# Patient Record
Sex: FEMALE | Race: WHITE | NOT HISPANIC OR LATINO | Employment: FULL TIME | URBAN - METROPOLITAN AREA
[De-identification: names, ages, dates, MRNs, and addresses within clinical notes are randomized per-mention and may not be internally consistent; named-entity substitution may affect disease eponyms.]

---

## 2017-08-04 ENCOUNTER — OFFICE VISIT (OUTPATIENT)
Dept: URGENT CARE | Facility: CLINIC | Age: 37
End: 2017-08-04
Payer: COMMERCIAL

## 2017-08-04 VITALS
OXYGEN SATURATION: 100 % | SYSTOLIC BLOOD PRESSURE: 122 MMHG | HEART RATE: 77 BPM | TEMPERATURE: 98 F | RESPIRATION RATE: 18 BRPM | WEIGHT: 138 LBS | DIASTOLIC BLOOD PRESSURE: 78 MMHG | BODY MASS INDEX: 20.92 KG/M2 | HEIGHT: 68 IN

## 2017-08-04 DIAGNOSIS — K52.9 GASTROENTERITIS: ICD-10-CM

## 2017-08-04 DIAGNOSIS — B35.3 TINEA PEDIS OF RIGHT FOOT: Primary | ICD-10-CM

## 2017-08-04 PROCEDURE — 3008F BODY MASS INDEX DOCD: CPT | Mod: S$GLB,,, | Performed by: EMERGENCY MEDICINE

## 2017-08-04 PROCEDURE — 99203 OFFICE O/P NEW LOW 30 MIN: CPT | Mod: S$GLB,,, | Performed by: EMERGENCY MEDICINE

## 2017-08-04 RX ORDER — MUPIROCIN 20 MG/G
OINTMENT TOPICAL
Qty: 1 TUBE | Refills: 0 | Status: SHIPPED | OUTPATIENT
Start: 2017-08-04 | End: 2018-04-18

## 2017-08-04 RX ORDER — ONDANSETRON 4 MG/1
TABLET, ORALLY DISINTEGRATING ORAL
Qty: 10 TABLET | Refills: 0 | Status: SHIPPED | OUTPATIENT
Start: 2017-08-04 | End: 2018-04-18

## 2017-08-04 RX ORDER — CEPHALEXIN 500 MG/1
500 CAPSULE ORAL EVERY 8 HOURS
Qty: 30 CAPSULE | Refills: 0 | Status: SHIPPED | OUTPATIENT
Start: 2017-08-04 | End: 2018-04-18

## 2017-08-04 NOTE — PROGRESS NOTES
"Subjective:       Patient ID: Natalia Miller is a 36 y.o. female.    Vitals:  height is 5' 8" (1.727 m) and weight is 62.6 kg (138 lb). Her temperature is 97.7 °F (36.5 °C). Her blood pressure is 122/78 and her pulse is 77. Her respiration is 18 and oxygen saturation is 100%.     Chief Complaint: Nausea and Toe Pain    Pt actually came because of the GI issues. She had nausea with diarrhea several days ago. The diarrhea went away after 24 hours. She was better and did eat supper last night She vomited again today. She got some OTC anti-emetic and feels better now. She is not pregnant and is on her period. No abdominal pain  She also has a swollen right little toe and a sore she sees. The toe discomfort has been for about one month.  She was concerned about staph infection.      Nausea   This is a new problem. Episode onset: 4 days. The problem occurs intermittently. The problem has been unchanged. Associated symptoms include fatigue, headaches, joint swelling and nausea. Nothing aggravates the symptoms. She has tried nothing for the symptoms. The treatment provided no relief.   Toe Pain    Incident onset: 1 month. The incident occurred at home. There was no injury mechanism. The pain is present in the right foot. The pain is at a severity of 1/10. The pain is mild. The pain has been constant since onset. She reports no foreign bodies present. Nothing aggravates the symptoms. The treatment provided no relief.     Review of Systems   Constitution: Positive for fatigue.   HENT: Positive for headaches.    Cardiovascular: Negative.    Endocrine: Negative.    Hematologic/Lymphatic: Negative.    Skin: Positive for color change.   Musculoskeletal: Positive for joint swelling.   Gastrointestinal: Positive for diarrhea and nausea.   Genitourinary: Negative.    Psychiatric/Behavioral: Negative.        Objective:      Physical Exam   Constitutional: She is oriented to person, place, and time. She appears well-developed " and well-nourished. She is cooperative.  Non-toxic appearance. She does not appear ill. No distress.   HENT:   Head: Normocephalic and atraumatic.   Right Ear: Hearing, tympanic membrane, external ear and ear canal normal.   Left Ear: Hearing, tympanic membrane, external ear and ear canal normal.   Nose: Nose normal. No mucosal edema, rhinorrhea or nasal deformity. No epistaxis. Right sinus exhibits no maxillary sinus tenderness and no frontal sinus tenderness. Left sinus exhibits no maxillary sinus tenderness and no frontal sinus tenderness.   Mouth/Throat: Uvula is midline, oropharynx is clear and moist and mucous membranes are normal. No trismus in the jaw. Normal dentition. No uvula swelling. No posterior oropharyngeal erythema.   Eyes: Conjunctivae and lids are normal. Right eye exhibits no discharge. Left eye exhibits no discharge. No scleral icterus.   Sclera clear bilat   Neck: Trachea normal, normal range of motion, full passive range of motion without pain and phonation normal. Neck supple.   Cardiovascular: Normal rate, regular rhythm, normal heart sounds, intact distal pulses and normal pulses.    Pulmonary/Chest: Effort normal and breath sounds normal. No respiratory distress.   Abdominal: Soft. Normal appearance and bowel sounds are normal. She exhibits no distension, no pulsatile midline mass and no mass. There is no tenderness.   Musculoskeletal: Normal range of motion. She exhibits no edema or deformity.   Neurological: She is alert and oriented to person, place, and time. She exhibits normal muscle tone. Coordination normal.   Skin: She is not diaphoretic. There is erythema (mild erythema and swelling to right little toe with  ulceration noted medial aspect of the toe. Dry scaly moist skin in web space of 4-5 th toe bilaterally but Rt>>Lt). No pallor.   Psychiatric: She has a normal mood and affect. Her speech is normal and behavior is normal. Judgment and thought content normal. Cognition and  memory are normal.   Nursing note and vitals reviewed.      Assessment:       1. Tinea pedis of right foot    2. Gastroenteritis        Plan:         Tinea pedis of right foot  -     mupirocin (BACTROBAN) 2 % ointment; Apply topically to wound twice a day  Dispense: 1 Tube; Refill: 0  -     cephALEXin (KEFLEX) 500 MG capsule; Take 1 capsule (500 mg total) by mouth every 8 (eight) hours.  Dispense: 30 capsule; Refill: 0    Gastroenteritis  -     ondansetron (ZOFRAN-ODT) 4 MG TbDL; One tablet sublingual tid prn nausea  Dispense: 10 tablet; Refill: 0

## 2018-04-18 ENCOUNTER — OFFICE VISIT (OUTPATIENT)
Dept: OBSTETRICS AND GYNECOLOGY | Facility: CLINIC | Age: 38
End: 2018-04-18
Attending: OBSTETRICS & GYNECOLOGY
Payer: COMMERCIAL

## 2018-04-18 VITALS
SYSTOLIC BLOOD PRESSURE: 138 MMHG | BODY MASS INDEX: 21.1 KG/M2 | WEIGHT: 139.25 LBS | DIASTOLIC BLOOD PRESSURE: 80 MMHG | HEIGHT: 68 IN

## 2018-04-18 DIAGNOSIS — Z12.31 VISIT FOR SCREENING MAMMOGRAM: ICD-10-CM

## 2018-04-18 DIAGNOSIS — Z01.419 WELL FEMALE EXAM WITH ROUTINE GYNECOLOGICAL EXAM: Primary | ICD-10-CM

## 2018-04-18 DIAGNOSIS — Z11.51 SCREENING FOR HUMAN PAPILLOMAVIRUS: ICD-10-CM

## 2018-04-18 DIAGNOSIS — N90.89 VULVAL LESION: ICD-10-CM

## 2018-04-18 PROCEDURE — 88305 TISSUE EXAM BY PATHOLOGIST: CPT

## 2018-04-18 PROCEDURE — 99395 PREV VISIT EST AGE 18-39: CPT | Mod: S$GLB,,, | Performed by: OBSTETRICS & GYNECOLOGY

## 2018-04-18 PROCEDURE — 87624 HPV HI-RISK TYP POOLED RSLT: CPT

## 2018-04-18 PROCEDURE — 99999 PR PBB SHADOW E&M-EST. PATIENT-LVL III: CPT | Mod: PBBFAC,,, | Performed by: OBSTETRICS & GYNECOLOGY

## 2018-04-18 PROCEDURE — 88305 TISSUE EXAM BY PATHOLOGIST: CPT | Mod: 26,,,

## 2018-04-18 PROCEDURE — 88175 CYTOPATH C/V AUTO FLUID REDO: CPT

## 2018-04-18 RX ORDER — LEVOMILNACIPRAN HYDROCHLORIDE 40 MG/1
CAPSULE, EXTENDED RELEASE ORAL
COMMUNITY
Start: 2018-03-28 | End: 2018-06-29 | Stop reason: DRUGHIGH

## 2018-04-20 NOTE — PROGRESS NOTES
Subjective:       Patient ID: Natalia Miller is a 37 y.o. female.    Chief Complaint:  New patient (Last pap 2016-negative )      History of Present Illness  37 year old her for annual.  Doing well except anxiety that she is currently working with a counselor on.  Reports having a vulvar lesion she would like removed.  Currently taking fetzime.  Periods regular.  No breast concerns.  No pains with intercourse.  Has a history of abnormal pap treated with a leep     GYN & OB History  Patient's last menstrual period was 04/10/2018.   Date of Last Pap: 2018    OB History    Para Term  AB Living   0             SAB TAB Ectopic Multiple Live Births                         Past Medical History:   Diagnosis Date    Abnormal Pap smear     Abnormal Pap smear of cervix     Depression     Deviated septum     Fibroids     Herpes simplex without mention of complication     oral as per pt    HPV (human papilloma virus) anogenital infection     Insomnia     Mental disorder        Past Surgical History:   Procedure Laterality Date    CERVICAL BIOPSY  W/ LOOP ELECTRODE EXCISION      COLPOSCOPY      TONSILLECTOMY, ADENOIDECTOMY         Review of Systems  Review of Systems   Constitutional: Negative for fatigue.   Respiratory: Negative for shortness of breath.    Cardiovascular: Negative for chest pain.   Gastrointestinal: Negative for abdominal pain, constipation, diarrhea and nausea.   Genitourinary: Negative for dyspareunia, dysuria, genital sores, menstrual problem, pelvic pain and vaginal bleeding.   Musculoskeletal: Negative for back pain.   Skin: Negative for rash.   Neurological: Negative for headaches.   Hematological: Negative for adenopathy.   Psychiatric/Behavioral: Positive for dysphoric mood. The patient is nervous/anxious.         Objective:   Physical Exam:   Constitutional: She is oriented to person, place, and time. She appears well-developed and well-nourished.      Neck: No  thyromegaly present.    Cardiovascular: Normal rate.     Pulmonary/Chest: Effort normal and breath sounds normal. Right breast exhibits no mass, no nipple discharge, no skin change, no tenderness and no bleeding. Left breast exhibits no mass, no nipple discharge, no skin change, no tenderness and no bleeding.        Abdominal: Soft. Normal appearance and bowel sounds are normal. She exhibits no distension and no mass. There is no tenderness. There is no rebound and no guarding.     Genitourinary: Vagina normal and uterus normal.       Pelvic exam was performed with patient supine. There is no rash, tenderness, lesion or injury on the right labia. There is no rash, tenderness, lesion or injury on the left labia. Uterus is not enlarged, not fixed and not tender. Cervix is normal. Right adnexum displays no mass, no tenderness and no fullness. Left adnexum displays no mass, no tenderness and no fullness. No erythema, tenderness, rectocele, cystocele or unspecified prolapse of vaginal walls in the vagina. No signs of injury around the vagina. No vaginal discharge found. Cervix exhibits no motion tenderness, no discharge and no friability.           Musculoskeletal: Normal range of motion and moves all extremeties.      Lymphadenopathy:     She has no axillary adenopathy.        Right: No supraclavicular adenopathy present.        Left: No supraclavicular adenopathy present.    Neurological: She is alert and oriented to person, place, and time.    Skin: Skin is warm and dry.    Psychiatric: She has a normal mood and affect. Her behavior is normal. Judgment normal.        Assessment/ Plan:     Well female exam with routine gynecological exam  -     Liquid-based pap smear, screening    Visit for screening mammogram  -     Mammo Digital Screening Bilat with Tomosynthesis CAD; Future; Expected date: 04/18/2018    Screening for human papillomavirus  -     HPV High Risk Genotypes, PCR    Vulval lesion  -     Tissue Specimen To  Pathology, Obstetrics/Gynecology    two vular lesions sent to pathology      Follow-up in about 1 year (around 4/18/2019).    Patient was counseled today on A.C.S. Pap guidelines and recommendations for yearly pelvic exams, mammograms and monthly self breast exams; to see her PCP for other health maintenance.

## 2018-04-24 LAB
HPV16 AG SPEC QL: NEGATIVE
HPV16+18+H RISK 12 DNA CVX-IMP: POSITIVE
HPV18 DNA SPEC QL NAA+PROBE: NEGATIVE

## 2018-05-25 ENCOUNTER — TELEPHONE (OUTPATIENT)
Dept: OBSTETRICS AND GYNECOLOGY | Facility: CLINIC | Age: 38
End: 2018-05-25

## 2018-05-25 NOTE — TELEPHONE ENCOUNTER
Spoke to pt letting her know that Dr. Castillo said she could take the 3:45 slot that day. Pt voiced understanding.

## 2018-05-25 NOTE — TELEPHONE ENCOUNTER
Pt is scheduled for colpo on 06/26 at 3:15 and would like to know if she can come any later that day or any day in [place].

## 2018-06-18 ENCOUNTER — CLINICAL SUPPORT (OUTPATIENT)
Dept: INFECTIOUS DISEASES | Facility: CLINIC | Age: 38
End: 2018-06-18
Payer: COMMERCIAL

## 2018-06-18 ENCOUNTER — OFFICE VISIT (OUTPATIENT)
Dept: INFECTIOUS DISEASES | Facility: CLINIC | Age: 38
End: 2018-06-18

## 2018-06-18 VITALS
DIASTOLIC BLOOD PRESSURE: 84 MMHG | SYSTOLIC BLOOD PRESSURE: 130 MMHG | TEMPERATURE: 98 F | BODY MASS INDEX: 20.61 KG/M2 | HEART RATE: 66 BPM | WEIGHT: 136 LBS | HEIGHT: 68 IN

## 2018-06-18 DIAGNOSIS — Z23 IMMUNIZATION DUE: ICD-10-CM

## 2018-06-18 DIAGNOSIS — Z71.84 TRAVEL ADVICE ENCOUNTER: Primary | ICD-10-CM

## 2018-06-18 DIAGNOSIS — Z71.84 TRAVEL ADVICE ENCOUNTER: ICD-10-CM

## 2018-06-18 PROCEDURE — 90691 TYPHOID VACCINE IM: CPT | Mod: S$GLB,,, | Performed by: INTERNAL MEDICINE

## 2018-06-18 PROCEDURE — 90471 IMMUNIZATION ADMIN: CPT | Mod: S$GLB,,, | Performed by: INTERNAL MEDICINE

## 2018-06-18 PROCEDURE — 99401 PREV MED CNSL INDIV APPRX 15: CPT | Mod: S$GLB,,, | Performed by: INTERNAL MEDICINE

## 2018-06-18 PROCEDURE — 99999 PR PBB SHADOW E&M-EST. PATIENT-LVL III: CPT | Mod: PBBFAC,,, | Performed by: INTERNAL MEDICINE

## 2018-06-18 PROCEDURE — 90636 HEP A/HEP B VACC ADULT IM: CPT | Mod: S$GLB,,, | Performed by: INTERNAL MEDICINE

## 2018-06-18 PROCEDURE — 90472 IMMUNIZATION ADMIN EACH ADD: CPT | Mod: S$GLB,,, | Performed by: INTERNAL MEDICINE

## 2018-06-18 RX ORDER — ATOVAQUONE AND PROGUANIL HYDROCHLORIDE 250; 100 MG/1; MG/1
TABLET, FILM COATED ORAL
Qty: 30 TABLET | Refills: 0 | Status: SHIPPED | OUTPATIENT
Start: 2018-06-18 | End: 2019-04-04

## 2018-06-18 RX ORDER — AZITHROMYCIN 500 MG/1
TABLET, FILM COATED ORAL
Qty: 2 TABLET | Refills: 0 | Status: SHIPPED | OUTPATIENT
Start: 2018-06-18 | End: 2018-06-29

## 2018-06-18 RX ORDER — LEVOMILNACIPRAN HYDROCHLORIDE 80 MG/1
1 CAPSULE, EXTENDED RELEASE ORAL DAILY
Refills: 5 | COMMUNITY
Start: 2018-06-12 | End: 2020-02-28

## 2018-06-18 NOTE — PROGRESS NOTES
Subjective:      Chief Complaint:   Chief Complaint   Patient presents with    Travel Consult     History of Present Illness    Patient  37 y.o. female who presents today for routine pretravel consultation.  The patient reports a past medical history of melanoma, ADHD,.  The patient reports the following medication allergies; sulfa (rash).  The patient reports the following food allergies; none .  The patient will be traveling to [facility] on august 6.  She will fly into [place] and spend 1 night.  Then travel to [facility].  The patient will be at this destination for 21 days. They will spend 1 night in [place].  She will be going hunting the other time.  The patient will be lodging at hotels and at a farm house.  The patient has travelled to the following other countries in the past; [place], [place], [place].  The patient reports that they received all their childhood vaccinations.  The patient reports receipt of the following travel related vaccinations; Tdap in 2014.  The purpose of this trip is vacation.    Review of Systems   Constitutional: Positive for malaise/fatigue.   Gastrointestinal: Positive for diarrhea, nausea and vomiting.   Psychiatric/Behavioral: The patient is nervous/anxious and has insomnia.        Objective:   Physical Exam   Assessment:     Pre-Travel clinic assessment    Plan:   Patient specific risks:      Patient doesn't have any medical problems that would restrict her travel.    Destination specific risks:      -Infectious Disease risks:       Mosquito Borne pathogens:  Reviewed basic mosquito avoidance precautions including wearing long sleeve clothing and insect repellant.  Malarone for malaria prevention.  No risk of yellow fever risk on this itinerary.       Food Borne pathogens:  Reviewed basic hand, food and water sanitation precautions.  Patient instructed to take hand  on their trip.  Typhoid IM vaccine today.  Hepatitis A/B IM series initiated today.   Azithromycin as needed for severe diarrhea.     Blood Borne Pathogens:  Hepatitis a/b vaccine series.     Routine:  She is up to date on Tdap.      -Environmental risks:     Risk of rabies with animal bite exposure was discussed with the patient.

## 2018-06-18 NOTE — PROGRESS NOTES
Pt received her Hepatitis A/B and Typhoid vaccinations. Pt tolerated the injections well. Return appts provided. Pt left the unit in NAD.

## 2018-06-21 ENCOUNTER — TELEPHONE (OUTPATIENT)
Dept: OBSTETRICS AND GYNECOLOGY | Facility: CLINIC | Age: 38
End: 2018-06-21

## 2018-06-26 ENCOUNTER — PROCEDURE VISIT (OUTPATIENT)
Dept: OBSTETRICS AND GYNECOLOGY | Facility: CLINIC | Age: 38
End: 2018-06-26
Payer: COMMERCIAL

## 2018-06-26 VITALS — HEIGHT: 68 IN | BODY MASS INDEX: 20.38 KG/M2 | WEIGHT: 134.5 LBS

## 2018-06-26 DIAGNOSIS — R87.810 CERVICAL HIGH RISK HUMAN PAPILLOMAVIRUS (HPV) DNA TEST POSITIVE: ICD-10-CM

## 2018-06-26 DIAGNOSIS — Z32.02 PREGNANCY TEST NEGATIVE: Primary | ICD-10-CM

## 2018-06-26 DIAGNOSIS — R87.619 ABNORMAL CERVICAL PAPANICOLAOU SMEAR, UNSPECIFIED ABNORMAL PAP FINDING: ICD-10-CM

## 2018-06-26 LAB
B-HCG UR QL: NEGATIVE
CTP QC/QA: YES

## 2018-06-26 PROCEDURE — 81025 URINE PREGNANCY TEST: CPT | Mod: S$GLB,,, | Performed by: OBSTETRICS & GYNECOLOGY

## 2018-06-26 PROCEDURE — 88305 TISSUE EXAM BY PATHOLOGIST: CPT | Mod: 26,,, | Performed by: PATHOLOGY

## 2018-06-26 PROCEDURE — 57500 BIOPSY OF CERVIX: CPT | Mod: S$GLB,,, | Performed by: OBSTETRICS & GYNECOLOGY

## 2018-06-26 PROCEDURE — 88305 TISSUE EXAM BY PATHOLOGIST: CPT | Performed by: PATHOLOGY

## 2018-06-26 NOTE — PROCEDURES
"Procedures   Colposcopy:    Natalia Miller is a 37 y.o. female   presents for colposcopy.  Patient's last menstrual period was 2018..  Her most recent pap smear shows normal pap with hpv other positive .      The abnormal test results were discussed.  We also discussed HPV infection and its association with abnormal Pap tests and cervical cancer.  The risks and benefits of colposcopy were reviewed.  She agrees to proceed.      UPT is negative    Vitals:  Vitals:    18 1533   Weight: 61 kg (134 lb 7.7 oz)   Height: 5' 8" (1.727 m)   PainSc: 0-No pain     Body mass index is 20.45 kg/m².    Colposcopy Exam:   TIME OUT PERFORMED.     acetowhite lesion(s) noted at 12 o'clock    Biopsy was taken at 12 o'clock.  ECC was performed    Hemostasis was adequate with application of Monsel's solution.  The speculum was removed.  The patient did tolerate the procedure well.    Previous leep times 2  All collected specimens sent to pathology for histologic analysis.    Assessment and Plan:  Pregnancy test negative  -     POCT urine pregnancy    Abnormal cervical Papanicolaou smear, unspecified abnormal pap finding        Post-colposcopy counseling:  For cramping take NSAIDs, Tylenol, or a prescription pain medication per the medication card.    Avoid intercourse, douching, or tampons in the vagina for at least 7 days.   Expect a clumpy brown, orange, yellow, or black discharge due to Monsel's solution application for several days.   Call the office for heavy bleeding, significant pain, or a  foul-smelling vaginal discharge.  The HPV vaccine was  recommended according to FDA age guidelines.  The importance of keeping follow-up appointments was discussed.    Final plan and follow up based on colposcopy results.  "

## 2018-06-29 ENCOUNTER — OFFICE VISIT (OUTPATIENT)
Dept: OBSTETRICS AND GYNECOLOGY | Facility: CLINIC | Age: 38
End: 2018-06-29
Attending: OBSTETRICS & GYNECOLOGY
Payer: COMMERCIAL

## 2018-06-29 ENCOUNTER — TELEPHONE (OUTPATIENT)
Dept: OBSTETRICS AND GYNECOLOGY | Facility: CLINIC | Age: 38
End: 2018-06-29

## 2018-06-29 VITALS — WEIGHT: 134.69 LBS | HEIGHT: 68 IN | BODY MASS INDEX: 20.41 KG/M2

## 2018-06-29 DIAGNOSIS — N92.0 MENORRHAGIA WITH REGULAR CYCLE: Primary | ICD-10-CM

## 2018-06-29 PROCEDURE — 3008F BODY MASS INDEX DOCD: CPT | Mod: CPTII,S$GLB,, | Performed by: OBSTETRICS & GYNECOLOGY

## 2018-06-29 PROCEDURE — 99999 PR PBB SHADOW E&M-EST. PATIENT-LVL III: CPT | Mod: PBBFAC,,, | Performed by: OBSTETRICS & GYNECOLOGY

## 2018-06-29 PROCEDURE — 99212 OFFICE O/P EST SF 10 MIN: CPT | Mod: S$GLB,,, | Performed by: OBSTETRICS & GYNECOLOGY

## 2018-06-29 RX ORDER — TRANEXAMIC ACID 650 MG/1
1300 TABLET ORAL 3 TIMES DAILY
Qty: 30 TABLET | Refills: 1 | Status: SHIPPED | OUTPATIENT
Start: 2018-06-29 | End: 2018-10-03 | Stop reason: SDUPTHER

## 2018-06-29 NOTE — TELEPHONE ENCOUNTER
Pt states she is saturating super tampon and pad every 15 minutes since last night.  LMP 6/4.  Spoke with Dr. Durán and she recommended pt see Dr. Castillo this afternoon.  Scheduled

## 2018-07-09 NOTE — PROGRESS NOTES
Subjective:       Patient ID: Natalia Miller is a 37 y.o. female.    Chief Complaint:  Vaginal Bleeding (c/o Heavy Bleeding since Colpo 3 days ago, big clots )      History of Present Illness  37 year old here after a colposcopy with concerns that bleeding is related to the colposcopy.  Patient unsure if this bleeding is period related.  Her period is due in the next couple of days.  Patient reports periods have gotten heavier with clots at times.  Denies SOB, signs of anemia, or pelvic pain.  Last pap abnormal and colposcopy results pending.      GYN & OB History  Patient's last menstrual period was 2018 (exact date).   Date of Last Pap: 2018    OB History    Para Term  AB Living   0             SAB TAB Ectopic Multiple Live Births                         Past Medical History:   Diagnosis Date    Abnormal Pap smear     Abnormal Pap smear of cervix     Depression     Deviated septum     Fibroids     Herpes simplex without mention of complication     oral as per pt    HPV (human papilloma virus) anogenital infection     Insomnia     Mental disorder        Past Surgical History:   Procedure Laterality Date    CERVICAL BIOPSY  W/ LOOP ELECTRODE EXCISION  2010    COLPOSCOPY  2018    TONSILLECTOMY, ADENOIDECTOMY  2006       Review of Systems  Review of Systems   Constitutional: Negative for fatigue.   Respiratory: Negative for shortness of breath.    Cardiovascular: Negative for chest pain.   Genitourinary: Positive for menstrual problem and vaginal bleeding. Negative for dysuria and pelvic pain.   Neurological: Negative for dizziness and weakness.        Objective:   Physical Exam:        Pulmonary/Chest: Breath sounds normal.        Abdominal: Soft.     Genitourinary: Uterus normal. Cervix exhibits friability (silver nitrate applied ).                 Psychiatric: She has a normal mood and affect.        Assessment/ Plan:     Menorrhagia with regular cycle  -     US  Pelvis Comp with Transvag NON-OB (xpd; Future; Expected date: 06/29/2018    Other orders  -     tranexamic acid (LYSTEDA) 650 mg tablet; Take 2 tablets (1,300 mg total) by mouth 3 (three) times daily.  Dispense: 30 tablet; Refill: 1         Follow-up in about 4 weeks (around 7/27/2018) for ultrasound.    Patient was counseled today on A.C.S. Pap guidelines and recommendations for yearly pelvic exams, mammograms and monthly self breast exams; to see her PCP for other health maintenance.

## 2018-07-18 ENCOUNTER — CLINICAL SUPPORT (OUTPATIENT)
Dept: INFECTIOUS DISEASES | Facility: CLINIC | Age: 38
End: 2018-07-18
Payer: COMMERCIAL

## 2018-07-18 DIAGNOSIS — Z71.84 TRAVEL ADVICE ENCOUNTER: ICD-10-CM

## 2018-07-18 DIAGNOSIS — Z23 IMMUNIZATION DUE: ICD-10-CM

## 2018-07-18 PROCEDURE — 90636 HEP A/HEP B VACC ADULT IM: CPT | Mod: S$GLB,,, | Performed by: INTERNAL MEDICINE

## 2018-07-18 PROCEDURE — 90471 IMMUNIZATION ADMIN: CPT | Mod: S$GLB,,, | Performed by: INTERNAL MEDICINE

## 2018-07-18 NOTE — PROGRESS NOTES
Pt received the second dose of her Hepatitis A/B vaccination. Pt tolerated the injection well. Return appt provided. Pt left the unit in NAD.

## 2018-07-19 ENCOUNTER — HOSPITAL ENCOUNTER (OUTPATIENT)
Dept: RADIOLOGY | Facility: HOSPITAL | Age: 38
Discharge: HOME | End: 2018-07-19
Attending: OBSTETRICS & GYNECOLOGY
Payer: COMMERCIAL

## 2018-07-19 DIAGNOSIS — Z12.31 VISIT FOR SCREENING MAMMOGRAM: ICD-10-CM

## 2018-07-19 PROCEDURE — 77063 BREAST TOMOSYNTHESIS BI: CPT | Mod: 26,,, | Performed by: RADIOLOGY

## 2018-07-19 PROCEDURE — 77067 SCR MAMMO BI INCL CAD: CPT | Mod: TC,PO

## 2018-07-19 PROCEDURE — 77067 SCR MAMMO BI INCL CAD: CPT | Mod: 26,,, | Performed by: RADIOLOGY

## 2018-10-03 RX ORDER — TRANEXAMIC ACID 650 MG/1
1300 TABLET ORAL 3 TIMES DAILY
Qty: 30 TABLET | Refills: 1 | Status: SHIPPED | OUTPATIENT
Start: 2018-10-03 | End: 2019-03-13

## 2018-11-28 ENCOUNTER — TELEPHONE (OUTPATIENT)
Dept: OBSTETRICS AND GYNECOLOGY | Facility: CLINIC | Age: 38
End: 2018-11-28

## 2018-11-28 DIAGNOSIS — Z91.89 AT HIGH RISK FOR BREAST CANCER: Primary | ICD-10-CM

## 2018-11-28 NOTE — TELEPHONE ENCOUNTER
Dr. Castillo patient calling- was supposed to have a MRI of the breast earlier this year- is now calling to see where she soul go,please call to further discuss

## 2018-11-28 NOTE — TELEPHONE ENCOUNTER
Anna pt- calling to find out more information about risk assessment score from her mmg. Spoke to pt about a referral to Dr. Santillan or Jeffrey to f/u for more information and to see if further imagining needed. Number to Faustino given, order for referral placed.     Mmg in April, normal. Risk assessment score of 28

## 2018-12-17 ENCOUNTER — TELEPHONE (OUTPATIENT)
Dept: OBSTETRICS AND GYNECOLOGY | Facility: CLINIC | Age: 38
End: 2018-12-17

## 2018-12-17 NOTE — TELEPHONE ENCOUNTER
Pt calling to find if she needs to come for her visit that is scheduled for tomorrow. Pt thought she was coming for a LEEP, her appt is scheduled for a repap and her results from colpo say she needs to repeat in a year.

## 2018-12-18 ENCOUNTER — CLINICAL SUPPORT (OUTPATIENT)
Dept: INFECTIOUS DISEASES | Facility: CLINIC | Age: 38
End: 2018-12-18
Payer: COMMERCIAL

## 2018-12-18 DIAGNOSIS — Z23 IMMUNIZATION DUE: ICD-10-CM

## 2018-12-18 DIAGNOSIS — Z71.84 TRAVEL ADVICE ENCOUNTER: ICD-10-CM

## 2018-12-18 PROCEDURE — 90471 IMMUNIZATION ADMIN: CPT | Mod: S$GLB,,, | Performed by: INTERNAL MEDICINE

## 2018-12-18 PROCEDURE — 90636 HEP A/HEP B VACC ADULT IM: CPT | Mod: S$GLB,,, | Performed by: INTERNAL MEDICINE

## 2018-12-18 NOTE — PROGRESS NOTES
Patient arrives for immunization injection of Twinrix 3rd dose - administered per order - left in no apparent distress.

## 2019-02-07 ENCOUNTER — TELEPHONE (OUTPATIENT)
Dept: OBSTETRICS AND GYNECOLOGY | Facility: CLINIC | Age: 39
End: 2019-02-07

## 2019-02-22 DIAGNOSIS — R23.2 HOT FLASHES: Primary | ICD-10-CM

## 2019-02-22 RX ORDER — DROSPIRENONE AND ETHINYL ESTRADIOL 0.02-3(28)
1 KIT ORAL DAILY
Qty: 28 TABLET | Refills: 6 | Status: CANCELLED | OUTPATIENT
Start: 2019-02-22 | End: 2020-02-22

## 2019-02-22 NOTE — TELEPHONE ENCOUNTER
Pt c/o hot flashes.  Thinks she perimenopausal.  Thinks she may need OCP like yall discussed at her last appt. She did well on Rose.  She was an emotional wreck on other OCPs.   She is a nonsmoker and states her BP is normal.     Rose pended

## 2019-02-27 ENCOUNTER — LAB VISIT (OUTPATIENT)
Dept: LAB | Facility: OTHER | Age: 39
End: 2019-02-27
Attending: OBSTETRICS & GYNECOLOGY
Payer: COMMERCIAL

## 2019-02-27 DIAGNOSIS — R23.2 HOT FLASHES: ICD-10-CM

## 2019-02-27 LAB
BASOPHILS # BLD AUTO: 0.02 K/UL
BASOPHILS NFR BLD: 0.4 %
DIFFERENTIAL METHOD: ABNORMAL
EOSINOPHIL # BLD AUTO: 0.1 K/UL
EOSINOPHIL NFR BLD: 2.2 %
ERYTHROCYTE [DISTWIDTH] IN BLOOD BY AUTOMATED COUNT: 12.5 %
ESTRADIOL SERPL-MCNC: 82 PG/ML
FSH SERPL-ACNC: 6.5 MIU/ML
HCT VFR BLD AUTO: 41.6 %
HGB BLD-MCNC: 13.8 G/DL
LYMPHOCYTES # BLD AUTO: 2.3 K/UL
LYMPHOCYTES NFR BLD: 42.2 %
MCH RBC QN AUTO: 31.2 PG
MCHC RBC AUTO-ENTMCNC: 33.2 G/DL
MCV RBC AUTO: 94 FL
MONOCYTES # BLD AUTO: 0.5 K/UL
MONOCYTES NFR BLD: 8.1 %
NEUTROPHILS # BLD AUTO: 2.6 K/UL
NEUTROPHILS NFR BLD: 46.9 %
PLATELET # BLD AUTO: 362 K/UL
PMV BLD AUTO: 10.1 FL
RBC # BLD AUTO: 4.43 M/UL
TSH SERPL DL<=0.005 MIU/L-ACNC: 1.85 UIU/ML
WBC # BLD AUTO: 5.54 K/UL

## 2019-02-27 PROCEDURE — 82670 ASSAY OF TOTAL ESTRADIOL: CPT

## 2019-02-27 PROCEDURE — 36415 COLL VENOUS BLD VENIPUNCTURE: CPT

## 2019-02-27 PROCEDURE — 85025 COMPLETE CBC W/AUTO DIFF WBC: CPT

## 2019-02-27 PROCEDURE — 84443 ASSAY THYROID STIM HORMONE: CPT

## 2019-02-27 PROCEDURE — 83001 ASSAY OF GONADOTROPIN (FSH): CPT

## 2019-03-01 ENCOUNTER — PATIENT MESSAGE (OUTPATIENT)
Dept: OBSTETRICS AND GYNECOLOGY | Facility: CLINIC | Age: 39
End: 2019-03-01

## 2019-03-01 ENCOUNTER — TELEPHONE (OUTPATIENT)
Dept: OBSTETRICS AND GYNECOLOGY | Facility: CLINIC | Age: 39
End: 2019-03-01

## 2019-03-13 ENCOUNTER — OFFICE VISIT (OUTPATIENT)
Dept: SURGERY | Facility: CLINIC | Age: 39
End: 2019-03-13
Payer: COMMERCIAL

## 2019-03-13 VITALS
DIASTOLIC BLOOD PRESSURE: 73 MMHG | TEMPERATURE: 98 F | SYSTOLIC BLOOD PRESSURE: 113 MMHG | BODY MASS INDEX: 20.38 KG/M2 | WEIGHT: 137.56 LBS | HEART RATE: 69 BPM | HEIGHT: 69 IN

## 2019-03-13 DIAGNOSIS — Z91.89 AT HIGH RISK FOR BREAST CANCER: Primary | ICD-10-CM

## 2019-03-13 DIAGNOSIS — Z12.39 BREAST CANCER SCREENING, HIGH RISK PATIENT: ICD-10-CM

## 2019-03-13 PROCEDURE — 99999 PR PBB SHADOW E&M-EST. PATIENT-LVL III: ICD-10-PCS | Mod: PBBFAC,,, | Performed by: SURGERY

## 2019-03-13 PROCEDURE — 99204 OFFICE O/P NEW MOD 45 MIN: CPT | Mod: S$GLB,,, | Performed by: SURGERY

## 2019-03-13 PROCEDURE — 99204 PR OFFICE/OUTPT VISIT, NEW, LEVL IV, 45-59 MIN: ICD-10-PCS | Mod: S$GLB,,, | Performed by: SURGERY

## 2019-03-13 PROCEDURE — 99999 PR PBB SHADOW E&M-EST. PATIENT-LVL III: CPT | Mod: PBBFAC,,, | Performed by: SURGERY

## 2019-03-13 PROCEDURE — 3008F PR BODY MASS INDEX (BMI) DOCUMENTED: ICD-10-PCS | Mod: CPTII,S$GLB,, | Performed by: SURGERY

## 2019-03-13 PROCEDURE — 3008F BODY MASS INDEX DOCD: CPT | Mod: CPTII,S$GLB,, | Performed by: SURGERY

## 2019-03-13 NOTE — PROGRESS NOTES
BREAST HIGH RISK CLINIC  [facility]  Breast Surgery      Date of Visit:  3/13/2019    Referring provider:  Ijeoma Castillo MD  [address]  [address]  [place] [address]    PCP:  Primary Doctor No    HIGH RISK    Natalia Miller is a 38 y.o. premenopausal female referred for evaluation of increased risk of breast cancer based on Tyrer-Viri score 28.24 %.  Here today to discussed options of high risk screening and risk reduction.    Other breast cancer risk factors include family hx on father's side (Aunt breast cancer 50's, Aunt breast cancer 60's), nulliparous and menarche before age 11.     Age of menarche was 10.  Last menstrual period was 3/12/19.  Patient denies hormonal therapy.     Patient is .    Medical History is significant for the following:  Past Medical History:   Diagnosis Date    Abnormal Pap smear     Abnormal Pap smear of cervix     Depression     Deviated septum     Fibroids     Herpes simplex without mention of complication     oral as per pt    HPV (human papilloma virus) anogenital infection     Insomnia     Mental disorder        Family History is significant for the following:  Family History   Problem Relation Age of Onset    Cancer Mother     Cancer Paternal Aunt     Lymphoma Paternal Aunt     Breast cancer Paternal Aunt     Cancer Cousin         ?    Breast cancer Paternal Aunt     Cancer Paternal Aunt     Cancer Paternal Grandfather         ?    Cancer Paternal Grandmother         ?    Colon cancer Maternal Grandfather     Cancer Maternal Grandfather     Thyroid cancer Maternal Aunt     Cancer Maternal Aunt          Social History:   Social History     Socioeconomic History    Marital status: Single     Spouse name: None    Number of children: None    Years of education: None    Highest education level: None   Social Needs    Financial resource strain: None    Food insecurity - worry: None    Food insecurity - inability: None     "Transportation needs - medical: None    Transportation needs - non-medical: None   Occupational History    None   Tobacco Use    Smoking status: Never Smoker    Smokeless tobacco: Never Used   Substance and Sexual Activity    Alcohol use: Yes    Drug use: No    Sexual activity: Yes     Partners: Male     Birth control/protection: Partner-Vasectomy     Comment: Single:    Other Topics Concern    None   Social History Narrative    None       The following portions of the patient's history were reviewed and updated as appropriate: allergies, current medications, past family history, past medical history and past surgical history.    Review of Systems  Constitutional: positive for night sweats and hot flashes, negative for fevers and weight loss  Respiratory: negative for cough and dyspnea on exertion  Cardiovascular: negative for chest pain and palpitations  Gastrointestinal: negative for abdominal pain and change in bowel habits  Genitourinary:negative for abnormal menstrual periods  Integument/breast: negative for breast lump, breast tenderness and nipple discharge  Musculoskeletal:negative for arthralgias and myalgias       Objective:   /73 (BP Location: Left arm, Patient Position: Sitting, BP Method: Medium (Automatic))   Pulse 69   Temp 97.8 °F (36.6 °C) (Oral)   Ht 5' 8.5" (1.74 m)   Wt 62.4 kg (137 lb 9.1 oz)   LMP 03/12/2019 (Exact Date)   BMI 20.61 kg/m²     Physical Exam   Constitutional: She appears well-developed and well-nourished.   HENT:   Head: Normocephalic.   Eyes: No scleral icterus.   Neck: Neck supple. No tracheal deviation present.   Cardiovascular: Normal rate and regular rhythm.    Pulmonary/Chest: Breath sounds normal. No respiratory distress. Right breast exhibits no inverted nipple, no mass, no nipple discharge and no skin change. Left breast exhibits no inverted nipple, no mass, no nipple discharge and no skin change.   Abdominal: Soft. She exhibits no mass. There is no " tenderness.   Musculoskeletal: She exhibits no edema.   Lymphadenopathy:     She has no cervical adenopathy.   Neurological: She is alert.   Skin: No rash noted. No erythema.     Psychiatric: She has a normal mood and affect.         Imaging  Mammograms:  7/19/18 Findings:  Computer-aided detection was utilized in the interpretation of this examination. This procedure was performed using tomosynthesis.       The breasts are extremely dense, which lowers the sensitivity of mammography. There is no evidence of suspicious masses, microcalcifications or architectural distortion.     Impression:  No mammographic evidence of malignancy.     BI-RADS Category 1: Negative     Recommendation:  Routine screening mammogram in 1 year is recommended.      In addition to annual mammogram beginning at age 30, consider annual screening with bilateral breast MRI with contrast in patients with a lifetime risk of breast cancer greater than 20%.     The patient's estimated lifetime risk of breast cancer (to age 85) based on Tyrer-Cuzick - 7 risk assessment model is: Tyrer-Cuzick: 28.24 %. According to the American Cancer Society,  patients with a lifetime breast cancer risk of 20% or higher might benefit from supplemental screening tests.       Assessment:     This is a 38 y.o. female with an increased risk of breast cancer based on Tyrer Cuzick score of 28.24%.        Plan:   Discussed risk models can vary based on the model used.  There are several models available and we currently use TC model for our patients with family history.  Based on this, the patient's risk exceeds 20%.  Patients with lifetime risk over 20% qualify for increased screening with MRI, in addition to mammograms.  We also would perform breast exams every 6 months.  Discussed pros and cons of MRI screening.    We also discussed risk reduction options. Discussed that we recommend a healthy lifestyle.      Nutrition we recommend fresh fruits and vegetables and lean  meats and avoidance of processed foods.    Exercise I recommend at least 30 minutes of cardiovascular exercise 4-5 times per week, even walking would have benefit.  Discussed that exercise can lower the relative risk of breast cancer by about 18-20%.  Also discussed that obesity is linked to higher risk of breast cancer, therefore exercise in important.    Discussed alcohol use and some studies suggest that increase alcohol intake may increase breast cancer risk.  Therefore, I do recommend limited alcohol intake.    Also discussed risk factors that are not modifiable, such as age at menarche, age at menopause, age at first pregnancy, and family history.     We did discuss hormone replacement therapy as well.  In patients at increased risk, I usually do not recommend HRT for long periods of time.      Discussed briefly risk reduction options with chemoprevention, such as Tamoxifen or Raloxifene.  These have been shown to lower the risk of breast cancer incidence, however have not been shown to improve survival in patients who do not have a breast cancer.    July MMG and F/u with Anna  December MRI and CBE with me.      I have spent 45 minutes on this encounter, 30 minutes of which were spent face to face counseling and 15 minutes on chart review and coordination of care.

## 2019-03-13 NOTE — LETTER
March 13, 2019      Ijeoma Castillo MD  [address]  [address]  [facility] [address]           [facility]  [address]  [facility] [address]  Phone: [phone]  Fax: [phone]          Patient: Natalia Miller   MR Number: [id number]   YOB: 1980   Date of Visit: 3/13/2019       Dear Dr. Ijeoma Castillo:    Thank you for referring Natalia Miller to me for evaluation. Attached you will find relevant portions of my assessment and plan of care.    If you have questions, please do not hesitate to call me. I look forward to following Natalia Miller along with you.    Sincerely,    Ila Murphy MD    Enclosure  CC:  No Recipients    If you would like to receive this communication electronically, please contact [email or web address] or [phone] to request more information on [employer] Link access.    For providers and/or their staff who would like to refer a patient to [facility], please contact us through our one-stop-shop provider referral line, [facility], at [phone].    If you feel you have received this communication in error or would no longer like to receive these types of communications, please e-mail [email or web address]

## 2019-04-04 ENCOUNTER — OFFICE VISIT (OUTPATIENT)
Dept: INFECTIOUS DISEASES | Facility: CLINIC | Age: 39
End: 2019-04-04

## 2019-04-04 VITALS
BODY MASS INDEX: 20.51 KG/M2 | HEIGHT: 69 IN | TEMPERATURE: 98 F | SYSTOLIC BLOOD PRESSURE: 122 MMHG | WEIGHT: 138.44 LBS | DIASTOLIC BLOOD PRESSURE: 73 MMHG | HEART RATE: 73 BPM

## 2019-04-04 DIAGNOSIS — Z71.84 TRAVEL ADVICE ENCOUNTER: Primary | ICD-10-CM

## 2019-04-04 PROCEDURE — 99401 PR PREVENT COUNSEL,INDIV,15 MIN: ICD-10-PCS | Mod: S$GLB,,, | Performed by: INTERNAL MEDICINE

## 2019-04-04 PROCEDURE — 99401 PREV MED CNSL INDIV APPRX 15: CPT | Mod: S$GLB,,, | Performed by: INTERNAL MEDICINE

## 2019-04-04 PROCEDURE — 99999 PR PBB SHADOW E&M-EST. PATIENT-LVL III: ICD-10-PCS | Mod: PBBFAC,,, | Performed by: INTERNAL MEDICINE

## 2019-04-04 PROCEDURE — 99999 PR PBB SHADOW E&M-EST. PATIENT-LVL III: CPT | Mod: PBBFAC,,, | Performed by: INTERNAL MEDICINE

## 2019-04-04 RX ORDER — AZITHROMYCIN 500 MG/1
500 TABLET, FILM COATED ORAL DAILY
Qty: 3 TABLET | Refills: 0 | Status: SHIPPED | OUTPATIENT
Start: 2019-04-04 | End: 2019-04-07

## 2019-04-04 RX ORDER — ATOVAQUONE AND PROGUANIL HYDROCHLORIDE 250; 100 MG/1; MG/1
TABLET, FILM COATED ORAL
Qty: 29 TABLET | Refills: 0 | Status: SHIPPED | OUTPATIENT
Start: 2019-04-04 | End: 2019-06-27 | Stop reason: SDUPTHER

## 2019-04-04 NOTE — PROGRESS NOTES
Subjective:      Chief Complaint:   Chief Complaint   Patient presents with    Travel Consult     History of Present Illness    Patient  38 y.o. female who presents today for routine pretravel consultation.  The patient reports a past medical history of fever blisters.  The patient reports the following medication allergies; sulfa.  The patient reports the following food allergies; none.  The patient will be traveling to [facility] on April 17.  The patient will be at this destination for 21 days.  She will stay in [facility] for 3 days.  Then [place].  The patient will be lodging at a hotel in [facility] and luxury camp site on the hunting ground.  The patient has travelled to the following other countries in the past; [place] [place].  The patient reports that they received all their childhood vaccinations.  The patient reports receipt of the following travel related vaccinations; hepatitis a/b vaccine series X3.  The purpose of this trip is for vacation and hunting.    Review of Systems   Constitutional: Positive for malaise/fatigue.   Psychiatric/Behavioral: The patient has insomnia.      Objective:   Physical Exam   Assessment:     Pre-Travel clinic assessment    Plan:   Patient specific risks:      Patient does not have any medical problems that would restrict her travel.    Destination specific risks:      -Infectious Disease risks:       Mosquito Borne pathogens:  Reviewed basic mosquito avoidance precautions including wearing long sleeve clothing and insect repellant.  [place] for malaria prevention.       Food Borne pathogens:  Reviewed basic hand, food and water sanitation precautions.  Patient instructed to take hand  on their trip.  She is up to date on hepatitis a and typhoid IM.  Will send azithromycin to her pharmacy for use as needed for severe diarrhea.     Blood Borne Pathogens:  She has had hepatitis b vaccination     STDs:  She is traveling with her fiancee.     Routine:   She is up to date on tetanus vaccination.

## 2019-04-09 ENCOUNTER — OFFICE VISIT (OUTPATIENT)
Dept: URGENT CARE | Facility: CLINIC | Age: 39
End: 2019-04-09
Payer: COMMERCIAL

## 2019-04-09 VITALS
HEART RATE: 76 BPM | SYSTOLIC BLOOD PRESSURE: 134 MMHG | DIASTOLIC BLOOD PRESSURE: 85 MMHG | OXYGEN SATURATION: 100 % | TEMPERATURE: 97 F | RESPIRATION RATE: 18 BRPM

## 2019-04-09 DIAGNOSIS — T14.8XXA ABRASION: Primary | ICD-10-CM

## 2019-04-09 PROCEDURE — 99214 OFFICE O/P EST MOD 30 MIN: CPT | Mod: S$GLB,,, | Performed by: FAMILY MEDICINE

## 2019-04-09 PROCEDURE — 99214 PR OFFICE/OUTPT VISIT, EST, LEVL IV, 30-39 MIN: ICD-10-PCS | Mod: S$GLB,,, | Performed by: FAMILY MEDICINE

## 2019-04-09 RX ORDER — MUPIROCIN 20 MG/G
OINTMENT TOPICAL 3 TIMES DAILY
Qty: 30 G | Refills: 0 | Status: SHIPPED | OUTPATIENT
Start: 2019-04-09 | End: 2020-02-28

## 2019-04-09 NOTE — PROGRESS NOTES
Subjective:       Patient ID: Natalia Miller is a 38 y.o. female.    Vitals:  oral temperature is 97.4 °F (36.3 °C). Her blood pressure is 134/85 and her pulse is 76. Her respiration is 18 and oxygen saturation is 100%.     Chief Complaint: Laceration    Pt reports she is experiencing pain, swelling, redness on her left hand due her engagement ring for the past few weeks saw derm and for steroid cream. Pt states she cut her hand on Saturday when trying to get it off her finger - abrasion on the top. She is leaving for [place] next week so did not want infection lingering on . No fever. She put bacitracin on it.    Laceration    The incident occurred more than 1 week ago. The laceration is located on the left hand. The pain has been constant since onset. She reports no foreign bodies present.       Constitution: Negative for chills, fatigue and fever.   HENT: Negative for congestion and sore throat.    Neck: Negative for painful lymph nodes.   Cardiovascular: Negative for chest pain and leg swelling.   Eyes: Negative for double vision and blurred vision.   Respiratory: Negative for cough and shortness of breath.    Gastrointestinal: Negative for nausea, vomiting and diarrhea.   Genitourinary: Negative for dysuria, frequency, urgency and history of kidney stones.   Musculoskeletal: Negative for joint pain, joint swelling, muscle cramps and muscle ache.   Skin: Positive for wound. Negative for color change, pale, rash, laceration, erythema and bruising.   Allergic/Immunologic: Negative for seasonal allergies.   Neurological: Negative for dizziness, history of vertigo, light-headedness, passing out and headaches.   Hematologic/Lymphatic: Negative for swollen lymph nodes.   Psychiatric/Behavioral: Negative for nervous/anxious, sleep disturbance and depression. The patient is not nervous/anxious.        Objective:      Physical Exam   Constitutional: She is oriented to person, place, and time. She appears  well-developed and well-nourished.   HENT:   Head: Normocephalic and atraumatic. Head is without abrasion, without contusion and without laceration.   Right Ear: External ear normal.   Left Ear: External ear normal.   Nose: Nose normal.   Mouth/Throat: Oropharynx is clear and moist.   Eyes: Pupils are equal, round, and reactive to light. Conjunctivae, EOM and lids are normal.   Neck: Trachea normal, full passive range of motion without pain and phonation normal. Neck supple.   Cardiovascular: Normal rate, regular rhythm and normal heart sounds.   Pulmonary/Chest: Effort normal and breath sounds normal. No stridor. No respiratory distress.   Musculoskeletal: Normal range of motion.        Hands:  Neurological: She is alert and oriented to person, place, and time.   Skin: Skin is warm, dry and intact. Capillary refill takes less than 2 seconds. No abrasion, no bruising, no burn, no ecchymosis, no laceration, no lesion and no rash noted. No erythema.   Psychiatric: She has a normal mood and affect. Her speech is normal and behavior is normal. Judgment and thought content normal. Cognition and memory are normal.   Nursing note and vitals reviewed.      Assessment:       1. Abrasion        Plan:         Abrasion  -     mupirocin (BACTROBAN) 2 % ointment; Apply topically 3 (three) times daily.  Dispense: 30 g; Refill: 0      Patient Instructions   PLEASE READ YOUR DISCHARGE INSTRUCTIONS ENTIRELY AS IT CONTAINS IMPORTANT INFORMATION.      Use the antibiotic ointment to the wound.     Can wash with antibacterial soap    Keep clean and covered if risk of getting it dirty    Please return or see your primary care doctor for signs of worsening infection (fever, worsening swelling/redness/pain, inability to move your extremity).      Please return or see your primary care doctor if you develop new or worsening symptoms.     Please arrange follow up with your primary medical clinic as soon as possible. You must understand that  you've received an Urgent Care treatment only and that you may be released before all of your medical problems are known or treated. You, the patient, will arrange for follow up as instructed. If your symptoms worsen or fail to improve you should go to the Emergency Room.  WE CANNOT RULE OUT ALL POSSIBLE CAUSES OF YOUR SYMPTOMS IN THE URGENT CARE SETTING PLEASE GO TO THE ER IF YOU FEELS YOUR CONDITION IS WORSENING OR YOU WOULD LIKE EMERGENT EVALUATION.      Abrasions  Abrasions are skin scrapes. Their treatment depends on how large and deep the abrasion is.  Home care  You may be prescribed an antibiotic cream or ointment to apply to the wound. This helps prevent infection. Follow instructions when using this medicine.  General care  · To care for the abrasion, do the following each day for as long as directed by your healthcare provider.  ¨ If you were given a bandage, change it once a day. If your bandage sticks to the wound, soak it in warm water until it loosens.  ¨ Wash the area with soap and warm water. You may do this in a sink or under a tub faucet or shower. Rinse off the soap. Then pat the area dry with a clean towel.  ¨ If antibiotic ointment or cream was prescribed, reapply it to the wound as directed. Cover the wound with a fresh nonstick bandage. If the bandage becomes wet or dirty, change it as soon as possible.  ¨ Some antibiotic ointments or cream can cause an allergic reaction or dermatitis. This may cause redness, itching and or hives. If this occurs, stop using the ointment immediately and wash off any remaining ointment. You may need to take some allergy medicine to relieve symptoms.  · You may use acetaminophen or ibuprofen to control pain unless another pain medicine was prescribed. Talk with your healthcare provider before using these medicines if you have chronic liver or kidney disease or ever had a stomach ulcer or GI bleeding. Dont use ibuprofen in children younger than six months  old.  · Most skin wounds heal within 10 days. But an infection may occur even with treatment. So its important to watch the wound for signs of infection as listed below.  Follow-up care  Follow up with your healthcare provider, or as advised.  When to seek medical advice  Call your healthcare provider right away if any of these occur:  · Fever of 100.4ºF (38ºC) or higher, or as directed by your healthcare provider  · Increasing pain, redness, swelling, or drainage from the wound  · Bleeding from the wound that does not stop after a few minutes of steady, firm pressure  · Decreased ability to move any body part near the wound  Date Last Reviewed: 3/3/2017  © 5756-1026 [employer]. [address] [place] [address], [place] [address]. All rights reserved. This information is not intended as a substitute for professional medical care. Always follow your healthcare professional's instructions.

## 2019-04-09 NOTE — PATIENT INSTRUCTIONS
PLEASE READ YOUR DISCHARGE INSTRUCTIONS ENTIRELY AS IT CONTAINS IMPORTANT INFORMATION.      Use the antibiotic ointment to the wound.     Can wash with antibacterial soap    Keep clean and covered if risk of getting it dirty    Please return or see your primary care doctor for signs of worsening infection (fever, worsening swelling/redness/pain, inability to move your extremity).      Please return or see your primary care doctor if you develop new or worsening symptoms.     Please arrange follow up with your primary medical clinic as soon as possible. You must understand that you've received an Urgent Care treatment only and that you may be released before all of your medical problems are known or treated. You, the patient, will arrange for follow up as instructed. If your symptoms worsen or fail to improve you should go to the Emergency Room.  WE CANNOT RULE OUT ALL POSSIBLE CAUSES OF YOUR SYMPTOMS IN THE URGENT CARE SETTING PLEASE GO TO THE ER IF YOU FEELS YOUR CONDITION IS WORSENING OR YOU WOULD LIKE EMERGENT EVALUATION.      Abrasions  Abrasions are skin scrapes. Their treatment depends on how large and deep the abrasion is.  Home care  You may be prescribed an antibiotic cream or ointment to apply to the wound. This helps prevent infection. Follow instructions when using this medicine.  General care  · To care for the abrasion, do the following each day for as long as directed by your healthcare provider.  ¨ If you were given a bandage, change it once a day. If your bandage sticks to the wound, soak it in warm water until it loosens.  ¨ Wash the area with soap and warm water. You may do this in a sink or under a tub faucet or shower. Rinse off the soap. Then pat the area dry with a clean towel.  ¨ If antibiotic ointment or cream was prescribed, reapply it to the wound as directed. Cover the wound with a fresh nonstick bandage. If the bandage becomes wet or dirty, change it as soon as possible.  ¨ Some  antibiotic ointments or cream can cause an allergic reaction or dermatitis. This may cause redness, itching and or hives. If this occurs, stop using the ointment immediately and wash off any remaining ointment. You may need to take some allergy medicine to relieve symptoms.  · You may use acetaminophen or ibuprofen to control pain unless another pain medicine was prescribed. Talk with your healthcare provider before using these medicines if you have chronic liver or kidney disease or ever had a stomach ulcer or GI bleeding. Dont use ibuprofen in children younger than six months old.  · Most skin wounds heal within 10 days. But an infection may occur even with treatment. So its important to watch the wound for signs of infection as listed below.  Follow-up care  Follow up with your healthcare provider, or as advised.  When to seek medical advice  Call your healthcare provider right away if any of these occur:  · Fever of 100.4ºF (38ºC) or higher, or as directed by your healthcare provider  · Increasing pain, redness, swelling, or drainage from the wound  · Bleeding from the wound that does not stop after a few minutes of steady, firm pressure  · Decreased ability to move any body part near the wound  Date Last Reviewed: 3/3/2017  © 4600-2102 [employer]. [address] [place] [address], [place] [address]. All rights reserved. This information is not intended as a substitute for professional medical care. Always follow your healthcare professional's instructions.

## 2019-06-27 ENCOUNTER — OFFICE VISIT (OUTPATIENT)
Dept: INFECTIOUS DISEASES | Facility: CLINIC | Age: 39
End: 2019-06-27
Payer: COMMERCIAL

## 2019-06-27 VITALS
BODY MASS INDEX: 19.73 KG/M2 | WEIGHT: 133.19 LBS | DIASTOLIC BLOOD PRESSURE: 74 MMHG | TEMPERATURE: 98 F | SYSTOLIC BLOOD PRESSURE: 113 MMHG | HEART RATE: 84 BPM | HEIGHT: 69 IN

## 2019-06-27 DIAGNOSIS — Z71.84 TRAVEL ADVICE ENCOUNTER: ICD-10-CM

## 2019-06-27 PROCEDURE — 99999 PR PBB SHADOW E&M-EST. PATIENT-LVL III: CPT | Mod: PBBFAC,,, | Performed by: INTERNAL MEDICINE

## 2019-06-27 PROCEDURE — 99999 PR PBB SHADOW E&M-EST. PATIENT-LVL III: ICD-10-PCS | Mod: PBBFAC,,, | Performed by: INTERNAL MEDICINE

## 2019-06-27 PROCEDURE — 99402 PREV MED CNSL INDIV APPRX 30: CPT | Mod: S$GLB,,, | Performed by: INTERNAL MEDICINE

## 2019-06-27 PROCEDURE — 99402 PR PREVENT COUNSEL,INDIV,30 MIN: ICD-10-PCS | Mod: S$GLB,,, | Performed by: INTERNAL MEDICINE

## 2019-06-27 RX ORDER — ATOVAQUONE AND PROGUANIL HYDROCHLORIDE 250; 100 MG/1; MG/1
TABLET, FILM COATED ORAL
Qty: 35 TABLET | Refills: 0 | Status: SHIPPED | OUTPATIENT
Start: 2019-06-27 | End: 2020-11-13

## 2019-06-27 RX ORDER — ATOVAQUONE AND PROGUANIL HYDROCHLORIDE 250; 100 MG/1; MG/1
TABLET, FILM COATED ORAL
Qty: 70 TABLET | Refills: 0 | Status: SHIPPED | OUTPATIENT
Start: 2019-06-27 | End: 2019-06-27

## 2019-06-27 RX ORDER — AZITHROMYCIN 500 MG/1
500 TABLET, FILM COATED ORAL DAILY
Qty: 6 TABLET | Refills: 0 | Status: SHIPPED | OUTPATIENT
Start: 2019-06-27 | End: 2019-06-27

## 2019-06-27 RX ORDER — AZITHROMYCIN 500 MG/1
500 TABLET, FILM COATED ORAL DAILY
Qty: 3 TABLET | Refills: 0 | Status: SHIPPED | OUTPATIENT
Start: 2019-06-27 | End: 2019-06-30

## 2019-06-27 NOTE — PROGRESS NOTES
Subjective:      Patient ID: Natalia Miller is a 38 y.o. female.    Chief Complaint:Travel Consult (Traveling to [facility] leaving on July 5th x 3 wks)      History of Present Illness  HPI     Travel Consult      Additional comments: Traveling to [facility] leaving on July 5th x 3 wks          Last edited by Renee Bustillos LPN on 6/27/2019  9:41 AM. (History)      Traveling to [facility] (went recently- saw Dr. Pacheco) and is UTD on vaccines.      Review of Systems   Constitution: Negative for chills, decreased appetite, fever, malaise/fatigue, night sweats, weight gain and weight loss.   HENT: Negative for congestion, ear pain, hearing loss, hoarse voice, sore throat and tinnitus.    Eyes: Negative for blurred vision, redness and visual disturbance.   Cardiovascular: Negative for chest pain, leg swelling and palpitations.   Respiratory: Negative for cough, hemoptysis, shortness of breath and sputum production.    Hematologic/Lymphatic: Negative for adenopathy. Does not bruise/bleed easily.   Skin: Negative for dry skin, itching, rash and suspicious lesions.   Musculoskeletal: Positive for joint pain. Negative for back pain, myalgias and neck pain.   Gastrointestinal: Negative for abdominal pain, constipation, diarrhea, heartburn, nausea and vomiting.   Genitourinary: Negative for dysuria, flank pain, frequency, hematuria, hesitancy and urgency.   Neurological: Negative for dizziness, headaches, numbness, paresthesias and weakness.   Psychiatric/Behavioral: Negative for depression and memory loss. The patient does not have insomnia and is not nervous/anxious.      Objective:   Physical Exam  Assessment:       1. Travel advice encounter          Plan:           Natalia was seen today for travel consult.    Diagnoses and all orders for this visit:    Travel advice encounter  -     atovaquone-proguanil (MALARONE) 250-100 mg Tab; Take one tablet daily for malaria prevention. Begin one day before entering malarious area  and continue for 1 week after return.    Other orders  -     azithromycin (ZITHROMAX) 500 MG tablet; Take 1 tablet (500 mg total) by mouth once daily. Take one tablet daily for 3 days as needed for traveler's diarrhea for 3 days    ASSESSMENT: Travel     PLAN:     The Patient was provided with an extensive travel guidance packet which provides travel information specific to the patients itinerary.     The patient's medical history was reviewed and the patient was counseled on:  -Dietary precautions.  -Personal protective measures to prevent insect-borne diseases (e.g., malaria, dengue).  -Precautions to prevent exposure to rabies and seek treatment for possible exposures.  -Precautions against sun exposure.  -Precautions against development of DVT during flight.  -Personal and travel safety.    The patient's immunization history was reviewed and, based on the patient's itinerary, immunizations were ordered.    The patient was encouraged to contact us about any problems that may develop after immunization and possible side effects were reviewed.    The patient was instructed to purchase Imodium over the counter to take in case diarrhea (without blood or fever) develops. An antibiotic was ordered for treatment if severe or bloody diarrhea develops and the patient was instructed on use and possible side effects.     The patient was also instructed to purchase insect repellent containing DEET or Picardin and apply according to repellent label instructions.  If indicated by the patients itinerary an anti-malarial agent was prescribed for malaria prophylaxis and possible side effects were reviewed.    The patient was instructed to contact us if problems develop after travel.    I have spent 30 minutes with the patient and more than 50% of the visit was spent counseling regarding travel.

## 2019-08-01 ENCOUNTER — TELEPHONE (OUTPATIENT)
Dept: OBSTETRICS AND GYNECOLOGY | Facility: CLINIC | Age: 39
End: 2019-08-01

## 2019-08-01 DIAGNOSIS — Z12.31 VISIT FOR SCREENING MAMMOGRAM: Primary | ICD-10-CM

## 2019-08-01 NOTE — TELEPHONE ENCOUNTER
----- Message from Juan Dexter sent at 7/31/2019 12:05 PM CDT -----  Contact: pt  Needs Advice    Reason for call: pt calling to have orders entered into the system for her yearly mmg.         Communication Preference: [phone]     Additional Information:

## 2019-11-05 ENCOUNTER — HOSPITAL ENCOUNTER (OUTPATIENT)
Dept: RADIOLOGY | Facility: OTHER | Age: 39
Discharge: HOME | End: 2019-11-05
Attending: OBSTETRICS & GYNECOLOGY
Payer: COMMERCIAL

## 2019-11-05 DIAGNOSIS — Z12.31 VISIT FOR SCREENING MAMMOGRAM: ICD-10-CM

## 2019-11-05 PROCEDURE — 77063 BREAST TOMOSYNTHESIS BI: CPT | Mod: 26,,, | Performed by: RADIOLOGY

## 2019-11-05 PROCEDURE — 77067 MAMMO DIGITAL SCREENING BILAT WITH TOMOSYNTHESIS_CAD: ICD-10-PCS | Mod: 26,,, | Performed by: RADIOLOGY

## 2019-11-05 PROCEDURE — 77067 SCR MAMMO BI INCL CAD: CPT | Mod: TC

## 2019-11-05 PROCEDURE — 77067 SCR MAMMO BI INCL CAD: CPT | Mod: 26,,, | Performed by: RADIOLOGY

## 2019-11-05 PROCEDURE — 77063 MAMMO DIGITAL SCREENING BILAT WITH TOMOSYNTHESIS_CAD: ICD-10-PCS | Mod: 26,,, | Performed by: RADIOLOGY

## 2019-11-07 ENCOUNTER — PATIENT MESSAGE (OUTPATIENT)
Dept: OBSTETRICS AND GYNECOLOGY | Facility: CLINIC | Age: 39
End: 2019-11-07

## 2019-12-02 ENCOUNTER — HOSPITAL ENCOUNTER (OUTPATIENT)
Dept: RADIOLOGY | Facility: HOSPITAL | Age: 39
Discharge: HOME | End: 2019-12-02
Attending: SURGERY
Payer: COMMERCIAL

## 2019-12-02 DIAGNOSIS — Z91.89 AT HIGH RISK FOR BREAST CANCER: ICD-10-CM

## 2019-12-02 PROCEDURE — 25500020 PHARM REV CODE 255: Performed by: SURGERY

## 2019-12-02 PROCEDURE — 77049 MRI BREAST W/WO CONTRAST, W/CAD, BILATERAL: ICD-10-PCS | Mod: 26,,, | Performed by: RADIOLOGY

## 2019-12-02 PROCEDURE — 77049 MRI BREAST C-+ W/CAD BI: CPT | Mod: TC

## 2019-12-02 PROCEDURE — A9577 INJ MULTIHANCE: HCPCS | Performed by: SURGERY

## 2019-12-02 PROCEDURE — 77049 MRI BREAST C-+ W/CAD BI: CPT | Mod: 26,,, | Performed by: RADIOLOGY

## 2019-12-02 RX ADMIN — GADOBENATE DIMEGLUMINE 13 ML: 529 INJECTION, SOLUTION INTRAVENOUS at 03:12

## 2020-02-19 ENCOUNTER — TELEPHONE (OUTPATIENT)
Dept: OBSTETRICS AND GYNECOLOGY | Facility: CLINIC | Age: 40
End: 2020-02-19

## 2020-02-28 ENCOUNTER — OFFICE VISIT (OUTPATIENT)
Dept: OBSTETRICS AND GYNECOLOGY | Facility: CLINIC | Age: 40
End: 2020-02-28
Payer: COMMERCIAL

## 2020-02-28 VITALS
DIASTOLIC BLOOD PRESSURE: 64 MMHG | HEIGHT: 68 IN | SYSTOLIC BLOOD PRESSURE: 100 MMHG | WEIGHT: 141 LBS | BODY MASS INDEX: 21.37 KG/M2

## 2020-02-28 DIAGNOSIS — N76.1 SUBACUTE AND CHRONIC VAGINITIS: ICD-10-CM

## 2020-02-28 DIAGNOSIS — B37.31 VULVOVAGINAL CANDIDIASIS: Primary | ICD-10-CM

## 2020-02-28 PROCEDURE — 3008F BODY MASS INDEX DOCD: CPT | Mod: CPTII,S$GLB,, | Performed by: NURSE PRACTITIONER

## 2020-02-28 PROCEDURE — 99999 PR PBB SHADOW E&M-EST. PATIENT-LVL III: CPT | Mod: PBBFAC,,, | Performed by: NURSE PRACTITIONER

## 2020-02-28 PROCEDURE — 99214 PR OFFICE/OUTPT VISIT, EST, LEVL IV, 30-39 MIN: ICD-10-PCS | Mod: S$GLB,,, | Performed by: NURSE PRACTITIONER

## 2020-02-28 PROCEDURE — 3008F PR BODY MASS INDEX (BMI) DOCUMENTED: ICD-10-PCS | Mod: CPTII,S$GLB,, | Performed by: NURSE PRACTITIONER

## 2020-02-28 PROCEDURE — 99999 PR PBB SHADOW E&M-EST. PATIENT-LVL III: ICD-10-PCS | Mod: PBBFAC,,, | Performed by: NURSE PRACTITIONER

## 2020-02-28 PROCEDURE — 99214 OFFICE O/P EST MOD 30 MIN: CPT | Mod: S$GLB,,, | Performed by: NURSE PRACTITIONER

## 2020-02-28 RX ORDER — FLUCONAZOLE 150 MG/1
TABLET ORAL
Qty: 3 TABLET | Refills: 0 | Status: SHIPPED | OUTPATIENT
Start: 2020-02-28 | End: 2020-07-08

## 2020-02-28 RX ORDER — ESZOPICLONE 3 MG/1
TABLET, FILM COATED ORAL
COMMUNITY
Start: 2018-06-28

## 2020-02-28 RX ORDER — NYSTATIN 100000 U/G
OINTMENT TOPICAL
Qty: 30 G | Refills: 0 | Status: SHIPPED | OUTPATIENT
Start: 2020-02-28 | End: 2021-01-27 | Stop reason: SDUPTHER

## 2020-02-28 RX ORDER — VALACYCLOVIR HYDROCHLORIDE 500 MG/1
TABLET, FILM COATED ORAL
COMMUNITY
Start: 2018-06-20

## 2020-02-28 RX ORDER — TAZAROTENE 0.05 MG/G
1 CREAM CUTANEOUS NIGHTLY
COMMUNITY
Start: 2019-12-17

## 2020-02-28 RX ORDER — LOTEPREDNOL ETABONATE AND TOBRAMYCIN 5; 3 MG/ML; MG/ML
SUSPENSION/ DROPS OPHTHALMIC
COMMUNITY
Start: 2019-12-17 | End: 2020-11-13

## 2020-02-28 RX ORDER — TRIAMCINOLONE ACETONIDE 1 MG/G
OINTMENT TOPICAL
Qty: 30 G | Refills: 0 | Status: SHIPPED | OUTPATIENT
Start: 2020-02-28 | End: 2021-01-27 | Stop reason: SDUPTHER

## 2020-02-28 NOTE — PROGRESS NOTES
"Chief Complaint:    Chief Complaint   Patient presents with    Vaginitis      Dr Castillo         (Dr. Castillo patient)    Last Pap:   2018      HPI:     Natalia Lee is a 39 y.o. female  presents with complaint of vulvovaginal itching.  She states itching has been chronic and going on for weeks/months.  She states it wakes her up from sleeping sometimes, and is also bed even during the day.  She is  and sexually active.    She declines STD screening today.    LMP:  Patient's last menstrual period was 2020 (exact date).  She is currently using vasectomy for contraception.    Past Medical History:   Diagnosis Date    Abnormal Pap smear     Abnormal Pap smear of cervix     ADHD (attention deficit hyperactivity disorder)     Depression     Deviated septum     Fibroids     Herpes simplex without mention of complication     oral as per pt    HPV (human papilloma virus) anogenital infection     Insomnia     Mental disorder        Past Surgical History:   Procedure Laterality Date    BUNIONECTOMY Right 2019    right     CERVICAL BIOPSY  W/ LOOP ELECTRODE EXCISION      COLPOSCOPY  2018    TONSILLECTOMY, ADENOIDECTOMY  2006       OB History    Para Term  AB Living   0   0         SAB TAB Ectopic Multiple Live Births                   ROS:     GENERAL:  Denies fever, chills, fatigability or weight loss/gain.  REPRODUCTIVE:  See HPI.  ABDOMEN:  Denies abdominal pain, nausea, vomiting, diarrhea, constipation, or change in appetite.  URINARY:  Denies incontinence, nocturia, frequency, dysuria, or hematuria.  CARDIOVASCULAR:  No chest pain. No shortness of breath. No leg cramps.  NEUROLOGICAL:  No headaches. No vision changes.    Physical Exam:     Vitals:    20 1304   BP: 100/64   Weight: 64 kg (140 lb 15.8 oz)   Height: 5' 8" (1.727 m)   PainSc: 0-No pain     Body mass index is 21.44 kg/m².    GENERAL: No acute distress, alert and engaged.  ABDOMEN:  (--) " suprapubic tenderness.  MUSCULOSKELETAL:  No CVA tenderness.  VULVA: vulva without masses, tenderness or lesions.  Small patch above clitoris on mons that is slightly hypopigmented. Small area of dryness to labia majora bilaterally near posterior introitus.   VAGINA: normal appearing vagina with normal color, and no lesions or ulcers; small amount clumpy white discharge - KOH/WetPrep collected.   CERVIX: normal appearing cervix without discharge or lesions; no CMT.   UTERUS: uterus is normal size, shape, consistency and non-tender; mobile.   ADNEXA: normal adnexa in size, non-tender and no masses palpated.    Results:     KOH/Wet Prep results:  BV:   neg,  Candida:  POS,  Trichomonas:   neg       (See full results under LABS in Epic)    Assessment/Plan:   Vulvovaginal candidiasis  -     fluconazole (DIFLUCAN) 150 MG Tab; Take one pill my mouth today (Day 1), one on Day 4, and one on Day 7.  Dispense: 3 tablet; Refill: 0  -     nystatin (MYCOSTATIN) ointment; Apply pea-sized amount to affected areas twice daily (mix with pea-sized amount of Triamcinolone ointment before applying).  Dispense: 30 g; Refill: 0  -     triamcinolone acetonide 0.1% (KENALOG) 0.1 % ointment; Apply pea-sized amount to affected areas twice daily (mix with pea-sized amount of Nystatin ointment before applying).  Dispense: 30 g; Refill: 0    Subacute and chronic vaginitis      Counseling:     * Use of the [email or web address] Patient Portal discussed and encouraged during today's visit.     Follow-up:  Follow up if symptoms worsen or fail to improve.

## 2020-03-09 ENCOUNTER — PATIENT MESSAGE (OUTPATIENT)
Dept: OBSTETRICS AND GYNECOLOGY | Facility: CLINIC | Age: 40
End: 2020-03-09

## 2020-03-20 ENCOUNTER — TELEPHONE (OUTPATIENT)
Dept: OBSTETRICS AND GYNECOLOGY | Facility: CLINIC | Age: 40
End: 2020-03-20

## 2020-03-23 ENCOUNTER — TELEPHONE (OUTPATIENT)
Dept: OBSTETRICS AND GYNECOLOGY | Facility: CLINIC | Age: 40
End: 2020-03-23

## 2020-03-23 RX ORDER — FLUCONAZOLE 150 MG/1
150 TABLET ORAL ONCE
Qty: 2 TABLET | Refills: 0 | Status: SHIPPED | OUTPATIENT
Start: 2020-03-23 | End: 2020-03-23

## 2020-07-08 ENCOUNTER — OFFICE VISIT (OUTPATIENT)
Dept: OBSTETRICS AND GYNECOLOGY | Facility: CLINIC | Age: 40
End: 2020-07-08
Attending: OBSTETRICS & GYNECOLOGY
Payer: COMMERCIAL

## 2020-07-08 VITALS
BODY MASS INDEX: 20.61 KG/M2 | SYSTOLIC BLOOD PRESSURE: 110 MMHG | HEIGHT: 68 IN | WEIGHT: 136 LBS | DIASTOLIC BLOOD PRESSURE: 60 MMHG

## 2020-07-08 DIAGNOSIS — Z11.51 SCREENING FOR HUMAN PAPILLOMAVIRUS: ICD-10-CM

## 2020-07-08 DIAGNOSIS — Z01.419 WELL FEMALE EXAM WITH ROUTINE GYNECOLOGICAL EXAM: Primary | ICD-10-CM

## 2020-07-08 PROCEDURE — 99396 PR PREVENTIVE VISIT,EST,40-64: ICD-10-PCS | Mod: S$GLB,,, | Performed by: OBSTETRICS & GYNECOLOGY

## 2020-07-08 PROCEDURE — 88175 CYTOPATH C/V AUTO FLUID REDO: CPT

## 2020-07-08 PROCEDURE — 99999 PR PBB SHADOW E&M-EST. PATIENT-LVL III: CPT | Mod: PBBFAC,,, | Performed by: OBSTETRICS & GYNECOLOGY

## 2020-07-08 PROCEDURE — 99999 PR PBB SHADOW E&M-EST. PATIENT-LVL III: ICD-10-PCS | Mod: PBBFAC,,, | Performed by: OBSTETRICS & GYNECOLOGY

## 2020-07-08 PROCEDURE — 99396 PREV VISIT EST AGE 40-64: CPT | Mod: S$GLB,,, | Performed by: OBSTETRICS & GYNECOLOGY

## 2020-07-08 PROCEDURE — 87624 HPV HI-RISK TYP POOLED RSLT: CPT

## 2020-07-09 NOTE — PROGRESS NOTES
Subjective:       Patient ID: Natalia Lee is a 39 y.o. female.    Chief Complaint:  Annual Exam      History of Present Illness  39 year old here for annual.  Doing well.  Reports having both mmg and mri at end of last year for elevated TC score.  No current breast concerns.  No period concerns.  Not painful or heavy.  Partner with vasectomy.  Discussed repeating pap.  No pelvic pain.  No hot flashes.    Discussed sleeping techniques     GYN & OB History  Patient's last menstrual period was 2020.   Date of Last Pap: 2018    OB History    Para Term  AB Living   0   0     0   SAB TAB Ectopic Multiple Live Births                   Past Medical History:   Diagnosis Date    Abnormal Pap smear     Abnormal Pap smear of cervix     ADHD (attention deficit hyperactivity disorder)     Depression     Deviated septum     Fibroids     Herpes simplex without mention of complication     oral as per pt    HPV (human papilloma virus) anogenital infection     Insomnia     Mental disorder        Past Surgical History:   Procedure Laterality Date    BUNIONECTOMY Right 2019    right     CERVICAL BIOPSY  W/ LOOP ELECTRODE EXCISION  2010    COLPOSCOPY  2018    TONSILLECTOMY, ADENOIDECTOMY  2006       Review of Systems  Review of Systems   Constitutional: Negative for fatigue.   Respiratory: Negative for shortness of breath.    Cardiovascular: Negative for chest pain.   Gastrointestinal: Negative for abdominal pain, constipation, diarrhea and nausea.   Genitourinary: Negative for dyspareunia, dysuria, menstrual problem, pelvic pain and vaginal bleeding.   Musculoskeletal: Negative for back pain.   Skin: Negative for rash.   Neurological: Negative for headaches.   Hematological: Negative for adenopathy.   Psychiatric/Behavioral: Positive for sleep disturbance. Negative for dysphoric mood. The patient is not nervous/anxious.         Objective:   Physical Exam:   Constitutional: She is  oriented to person, place, and time. She appears well-developed and well-nourished.      Neck: No thyromegaly present.     Pulmonary/Chest: Effort normal. Right breast exhibits no mass, no nipple discharge, no skin change, no tenderness and no bleeding. Left breast exhibits no mass, no nipple discharge, no skin change, no tenderness and no bleeding.        Abdominal: Soft. Normal appearance and bowel sounds are normal. She exhibits no distension and no mass. There is no abdominal tenderness. There is no rebound and no guarding.     Genitourinary:    Vagina and uterus normal.      Pelvic exam was performed with patient supine.   There is no rash, tenderness, lesion or injury on the right labia. There is no rash, tenderness, lesion or injury on the left labia. Uterus is not enlarged, not fixed and not tender. Cervix is normal. Right adnexum displays no mass, no tenderness and no fullness. Left adnexum displays no mass, no tenderness and no fullness. No erythema, tenderness, rectocele, cystocele or unspecified prolapse of vaginal walls in the vagina.    No signs of injury in the vagina.   Cervix exhibits no motion tenderness, no discharge and no friability.           Musculoskeletal: Normal range of motion and moves all extremeties.      Lymphadenopathy:        Right: No supraclavicular adenopathy present.        Left: No supraclavicular adenopathy present.    Neurological: She is alert and oriented to person, place, and time.    Skin: Skin is warm and dry.    Psychiatric: She has a normal mood and affect. Her behavior is normal. Judgment normal.        Assessment/ Plan:     Well female exam with routine gynecological exam  -     Liquid-Based Pap Smear, Screening    Screening for human papillomavirus  -     HPV High Risk Genotypes, PCR         No follow-ups on file.    Patient was counseled today on A.C.S. Pap guidelines and recommendations for yearly pelvic exams, mammograms and monthly self breast exams; to see her  PCP for other health maintenance.

## 2020-07-13 ENCOUNTER — TELEPHONE (OUTPATIENT)
Dept: OBSTETRICS AND GYNECOLOGY | Facility: CLINIC | Age: 40
End: 2020-07-13

## 2020-07-13 NOTE — TELEPHONE ENCOUNTER
----- Message from Ijeoma Castillo MD sent at 7/13/2020 10:46 AM CDT -----  Regarding: FW: follow up  Please tell fantasma to schedule her mmg for novemeber and then fo mri 6 months after this in order for insurance to cover! Thanks   ----- Message -----  From: Ila Murphy MD  Sent: 7/9/2020   4:28 PM CDT  To: Ijeoma Castillo MD  Subject: RE: follow up                                    Tricky situation for reimbursement.    Mammo won't be covered unless it is over 365 days from last.    Coverage is for one mammo and one MRI per year.    If I remember correctly, she may have wanted to get her MRI in before the end of year because she had already met her deductible.    Since she has technically had her MRI already for the screen year (which started in Nov with her mammo), I would probably advise her to continue Nov Mammo and MRI 6 months after that.    Some places do them both at the same time every year.  We, as a group, just feel like it makes more sense to stagger them to  something that might develop in the interval.    You can certainly try to order the MRI for this 6 month period but it may get denied for coverage.    Hope this helps!  We are happy to discuss it more with her if she isn't sure what to do.    Hope all is well!    Ila  ----- Message -----  From: Ijeoma Castillo MD  Sent: 7/9/2020   3:06 PM CDT  To: Ila Murphy MD  Subject: follow up                                        Hi! Can you give me your thoughts!! This patient has an elevated TC score and was supposed to do every 6 month imaging but she did both in decemberish.  Should I just do mmg now and restart mri in December thanks   Hope yall are doing well ijeoma

## 2020-07-14 LAB
HPV HR 12 DNA SPEC QL NAA+PROBE: NEGATIVE
HPV16 AG SPEC QL: NEGATIVE
HPV18 DNA SPEC QL NAA+PROBE: NEGATIVE

## 2020-07-16 LAB
FINAL PATHOLOGIC DIAGNOSIS: NORMAL
Lab: NORMAL

## 2020-08-18 ENCOUNTER — PATIENT MESSAGE (OUTPATIENT)
Dept: OBSTETRICS AND GYNECOLOGY | Facility: CLINIC | Age: 40
End: 2020-08-18

## 2020-08-18 DIAGNOSIS — Z91.89 INCREASED RISK OF BREAST CANCER: Primary | ICD-10-CM

## 2020-08-18 RX ORDER — FLUCONAZOLE 150 MG/1
150 TABLET ORAL DAILY
Qty: 1 TABLET | Refills: 0 | Status: SHIPPED | OUTPATIENT
Start: 2020-08-18 | End: 2020-08-19

## 2020-08-18 NOTE — TELEPHONE ENCOUNTER
I notified patient.schedule her mmg for novemeber and then fo mri 6 months after this in order for insurance to cover!

## 2020-09-28 ENCOUNTER — PATIENT MESSAGE (OUTPATIENT)
Dept: OBSTETRICS AND GYNECOLOGY | Facility: CLINIC | Age: 40
End: 2020-09-28

## 2020-09-28 RX ORDER — FLUCONAZOLE 150 MG/1
150 TABLET ORAL DAILY
Qty: 1 TABLET | Refills: 0 | Status: SHIPPED | OUTPATIENT
Start: 2020-09-28 | End: 2020-09-29

## 2020-09-28 NOTE — TELEPHONE ENCOUNTER
Pt is requesting a refill of Diflucan for a yeast infection.    Rx pended  Allergies and pharmacy utd.

## 2020-09-29 ENCOUNTER — PATIENT MESSAGE (OUTPATIENT)
Dept: OBSTETRICS AND GYNECOLOGY | Facility: CLINIC | Age: 40
End: 2020-09-29

## 2020-10-06 ENCOUNTER — PATIENT MESSAGE (OUTPATIENT)
Dept: OBSTETRICS AND GYNECOLOGY | Facility: CLINIC | Age: 40
End: 2020-10-06

## 2020-11-06 ENCOUNTER — HOSPITAL ENCOUNTER (OUTPATIENT)
Dept: RADIOLOGY | Facility: OTHER | Age: 40
Discharge: HOME | End: 2020-11-06
Attending: OBSTETRICS & GYNECOLOGY
Payer: COMMERCIAL

## 2020-11-06 DIAGNOSIS — Z91.89 INCREASED RISK OF BREAST CANCER: ICD-10-CM

## 2020-11-06 DIAGNOSIS — Z12.31 BREAST CANCER SCREENING BY MAMMOGRAM: ICD-10-CM

## 2020-11-06 PROCEDURE — 77063 BREAST TOMOSYNTHESIS BI: CPT | Mod: 26,,, | Performed by: RADIOLOGY

## 2020-11-06 PROCEDURE — 77063 MAMMO DIGITAL SCREENING BILAT WITH TOMO: ICD-10-PCS | Mod: 26,,, | Performed by: RADIOLOGY

## 2020-11-06 PROCEDURE — 77067 MAMMO DIGITAL SCREENING BILAT WITH TOMO: ICD-10-PCS | Mod: 26,,, | Performed by: RADIOLOGY

## 2020-11-06 PROCEDURE — 77067 SCR MAMMO BI INCL CAD: CPT | Mod: TC

## 2020-11-06 PROCEDURE — 77067 SCR MAMMO BI INCL CAD: CPT | Mod: 26,,, | Performed by: RADIOLOGY

## 2020-11-13 ENCOUNTER — CLINICAL SUPPORT (OUTPATIENT)
Dept: INFECTIOUS DISEASES | Facility: CLINIC | Age: 40
End: 2020-11-13
Payer: COMMERCIAL

## 2020-11-13 ENCOUNTER — OFFICE VISIT (OUTPATIENT)
Dept: INFECTIOUS DISEASES | Facility: CLINIC | Age: 40
End: 2020-11-13
Payer: COMMERCIAL

## 2020-11-13 VITALS
HEIGHT: 68 IN | BODY MASS INDEX: 20.58 KG/M2 | WEIGHT: 135.81 LBS | TEMPERATURE: 98 F | DIASTOLIC BLOOD PRESSURE: 85 MMHG | HEART RATE: 62 BPM | SYSTOLIC BLOOD PRESSURE: 124 MMHG

## 2020-11-13 DIAGNOSIS — Z23 NEED FOR IMMUNIZATION AGAINST TYPHOID: ICD-10-CM

## 2020-11-13 DIAGNOSIS — Z71.84 COUNSELING ABOUT TRAVEL: Primary | ICD-10-CM

## 2020-11-13 DIAGNOSIS — Z71.85 VACCINE COUNSELING: ICD-10-CM

## 2020-11-13 DIAGNOSIS — Z29.89 NEED FOR MALARIA PROPHYLAXIS: ICD-10-CM

## 2020-11-13 DIAGNOSIS — Z11.52 ENCOUNTER FOR SCREENING LABORATORY TESTING FOR COVID-19 VIRUS: ICD-10-CM

## 2020-11-13 PROCEDURE — 90691 TYPHOID VICPS VACCINE IM: ICD-10-PCS | Mod: S$GLB,,, | Performed by: INTERNAL MEDICINE

## 2020-11-13 PROCEDURE — 99402 PREV MED CNSL INDIV APPRX 30: CPT | Mod: 25,S$GLB,, | Performed by: INTERNAL MEDICINE

## 2020-11-13 PROCEDURE — 99999 PR PBB SHADOW E&M-EST. PATIENT-LVL II: CPT | Mod: PBBFAC,,,

## 2020-11-13 PROCEDURE — 99402 PR PREVENT COUNSEL,INDIV,30 MIN: ICD-10-PCS | Mod: 25,S$GLB,, | Performed by: INTERNAL MEDICINE

## 2020-11-13 PROCEDURE — 90691 TYPHOID VACCINE IM: CPT | Mod: S$GLB,,, | Performed by: INTERNAL MEDICINE

## 2020-11-13 PROCEDURE — 99999 PR PBB SHADOW E&M-EST. PATIENT-LVL III: CPT | Mod: PBBFAC,,, | Performed by: INTERNAL MEDICINE

## 2020-11-13 PROCEDURE — 1125F AMNT PAIN NOTED PAIN PRSNT: CPT | Mod: S$GLB,,, | Performed by: INTERNAL MEDICINE

## 2020-11-13 PROCEDURE — 99999 PR PBB SHADOW E&M-EST. PATIENT-LVL III: ICD-10-PCS | Mod: PBBFAC,,, | Performed by: INTERNAL MEDICINE

## 2020-11-13 PROCEDURE — 99999 PR PBB SHADOW E&M-EST. PATIENT-LVL II: ICD-10-PCS | Mod: PBBFAC,,,

## 2020-11-13 PROCEDURE — 3008F PR BODY MASS INDEX (BMI) DOCUMENTED: ICD-10-PCS | Mod: CPTII,S$GLB,, | Performed by: INTERNAL MEDICINE

## 2020-11-13 PROCEDURE — 90471 IMMUNIZATION ADMIN: CPT | Mod: S$GLB,,, | Performed by: INTERNAL MEDICINE

## 2020-11-13 PROCEDURE — 90471 TYPHOID VICPS VACCINE IM: ICD-10-PCS | Mod: S$GLB,,, | Performed by: INTERNAL MEDICINE

## 2020-11-13 PROCEDURE — 1125F PR PAIN SEVERITY QUANTIFIED, PAIN PRESENT: ICD-10-PCS | Mod: S$GLB,,, | Performed by: INTERNAL MEDICINE

## 2020-11-13 PROCEDURE — 3008F BODY MASS INDEX DOCD: CPT | Mod: CPTII,S$GLB,, | Performed by: INTERNAL MEDICINE

## 2020-11-13 RX ORDER — ATOVAQUONE AND PROGUANIL HYDROCHLORIDE 250; 100 MG/1; MG/1
1 TABLET, FILM COATED ORAL DAILY
Qty: 27 TABLET | Refills: 0 | Status: SHIPPED | OUTPATIENT
Start: 2020-11-13 | End: 2021-04-28 | Stop reason: SDUPTHER

## 2020-11-13 RX ORDER — AZITHROMYCIN 500 MG/1
500 TABLET, FILM COATED ORAL DAILY
Qty: 3 TABLET | Refills: 0 | Status: SHIPPED | OUTPATIENT
Start: 2020-11-13 | End: 2020-11-16

## 2020-11-13 NOTE — PROGRESS NOTES
Subjective:      Patient ID: Natalia Lee is a 40 y.o. female.    Chief Complaint:Travel Consult      History of Present Illness  40-year-old female with history of ADHD, HSV2, presents for travel counseling.    Patient will be going to [facility] with her  12/3/2020 for 18 days. She will be going on safari. She will be visiting [facility]. She has been to [facility] twice already, visited travel clinic on both occasions.    Hep A-Hep B (Hepatitis A / Hepatitis B) 12/18/2018, 7/18/2018, 6/18/2018   Tdap 6/2/2014, 7/19/2010   influenza, injectable, quadrivalent, preservative free (Influenza - Quadrivalent - PF *Preferred* (6 months and older)) 8/21/2020, 10/14/2019, 10/17/2018   typhoid, ViCPs (Typhoid - ViCPs) 6/18/2018         Review of Systems   Constitution: Negative for chills, decreased appetite, fever, malaise/fatigue, night sweats, weight gain and weight loss.   HENT: Positive for congestion. Negative for ear pain, hearing loss, hoarse voice, sore throat and tinnitus.    Eyes: Negative for blurred vision, redness and visual disturbance.   Cardiovascular: Negative for chest pain, leg swelling and palpitations.   Respiratory: Positive for cough. Negative for hemoptysis, shortness of breath and sputum production.    Hematologic/Lymphatic: Negative for adenopathy. Does not bruise/bleed easily.   Skin: Negative for dry skin, itching, rash and suspicious lesions.   Musculoskeletal: Positive for back pain and joint pain. Negative for myalgias and neck pain.   Gastrointestinal: Negative for abdominal pain, constipation, diarrhea, heartburn, nausea and vomiting.   Genitourinary: Negative for dysuria, flank pain, frequency, hematuria, hesitancy and urgency.   Neurological: Negative for dizziness, headaches, numbness, paresthesias and weakness.   Psychiatric/Behavioral: Negative for depression and memory loss. The patient has insomnia and is nervous/anxious.      Objective:   Physical Exam  Vitals signs  reviewed.   Constitutional:       General: She is not in acute distress.     Appearance: She is well-developed. She is not diaphoretic.   HENT:      Head: Normocephalic and atraumatic.   Eyes:      Conjunctiva/sclera: Conjunctivae normal.   Neck:      Musculoskeletal: Normal range of motion and neck supple.   Pulmonary:      Effort: Pulmonary effort is normal. No respiratory distress.   Abdominal:      General: There is no distension.      Palpations: Abdomen is soft.   Musculoskeletal: Normal range of motion.   Skin:     General: Skin is warm and dry.      Findings: No erythema or rash.   Neurological:      Mental Status: She is alert and oriented to person, place, and time.   Psychiatric:         Behavior: Behavior normal.             Assessment:   40-year-old female  - Pre-Travel clinic assessment  - Vaccine counseling  - Need typhoid vaccine  - Need malaria prophylaxis  - Need COVID-19 screening prior to entry    Plan:     The patient was provided with an extensive travel guidance packet which provides travel information specific to the patients itinerary.     The patient's medical history was reviewed and the patient was counseled on:  -Dietary precautions.  -Personal protective measures to prevent insect-borne diseases (e.g., malaria, dengue).  -Precautions to prevent exposure to rabies and seek treatment for possible exposures.  -Precautions against sun exposure.  -Precautions against development of DVT during flight.  -Personal and travel safety.    Vaccinations:  The patient's immunization history was reviewed and, based on the patient's itinerary, the following immunizations were ordered:  - Typhoid IM x1    The patient was encouraged to contact us about any problems that may develop after immunization and possible side effects were reviewed.      Traveler's diarrhea:  The patient was instructed to purchase Imodium over the counter to take in case diarrhea (without blood or fever) develops.      Azithromycin was ordered for treatment if severe or bloody diarrhea develops and the patient was instructed on use and possible side effects.  - Azithromycin 500 mg PO x 3 days    Insect precautions:   The patient was also instructed to purchase insect repellent containing DEET or Picardin and apply according to repellent label instructions.      An anti-malarial agent was prescribed for malaria prophylaxis and possible side effects were reviewed.  - Atovaquone-proguanil 250-100 mg PO qdaily, start 2 days before expected exposure and end 7 days after last day of exposure.      The patient was instructed to contact us if problems develop after travel.    I spent 30 minutes with the patient.  The entire visit was face-to-face with greater than 50% of the time spent on counseling.    Lisa Emery MD MPH  [facility]

## 2020-11-16 ENCOUNTER — PATIENT MESSAGE (OUTPATIENT)
Dept: INFECTIOUS DISEASES | Facility: CLINIC | Age: 40
End: 2020-11-16

## 2020-11-29 ENCOUNTER — PATIENT MESSAGE (OUTPATIENT)
Dept: OBSTETRICS AND GYNECOLOGY | Facility: CLINIC | Age: 40
End: 2020-11-29

## 2020-11-29 DIAGNOSIS — N92.6 IRREGULAR BLEEDING: Primary | ICD-10-CM

## 2020-12-01 NOTE — TELEPHONE ENCOUNTER
I spoke with patient.She c/o of bleeding in between periods. Not heavy but enough to wear a tampon. Not on OCPs.UPT negative.  Bleeding precautions given. Ultrasound scheduled for irregular bleeding.

## 2021-01-13 ENCOUNTER — OFFICE VISIT (OUTPATIENT)
Dept: OBSTETRICS AND GYNECOLOGY | Facility: CLINIC | Age: 41
End: 2021-01-13
Attending: OBSTETRICS & GYNECOLOGY
Payer: COMMERCIAL

## 2021-01-13 VITALS
SYSTOLIC BLOOD PRESSURE: 120 MMHG | DIASTOLIC BLOOD PRESSURE: 80 MMHG | WEIGHT: 139.88 LBS | HEIGHT: 68 IN | BODY MASS INDEX: 21.2 KG/M2

## 2021-01-13 DIAGNOSIS — R53.83 FATIGUE, UNSPECIFIED TYPE: ICD-10-CM

## 2021-01-13 DIAGNOSIS — L65.9 HAIR LOSS: ICD-10-CM

## 2021-01-13 DIAGNOSIS — N92.6 IRREGULAR BLEEDING: Primary | ICD-10-CM

## 2021-01-13 PROCEDURE — 1126F AMNT PAIN NOTED NONE PRSNT: CPT | Mod: S$GLB,,, | Performed by: OBSTETRICS & GYNECOLOGY

## 2021-01-13 PROCEDURE — 3008F PR BODY MASS INDEX (BMI) DOCUMENTED: ICD-10-PCS | Mod: CPTII,S$GLB,, | Performed by: OBSTETRICS & GYNECOLOGY

## 2021-01-13 PROCEDURE — 99213 OFFICE O/P EST LOW 20 MIN: CPT | Mod: S$GLB,,, | Performed by: OBSTETRICS & GYNECOLOGY

## 2021-01-13 PROCEDURE — 99999 PR PBB SHADOW E&M-EST. PATIENT-LVL III: ICD-10-PCS | Mod: PBBFAC,,, | Performed by: OBSTETRICS & GYNECOLOGY

## 2021-01-13 PROCEDURE — 1126F PR PAIN SEVERITY QUANTIFIED, NO PAIN PRESENT: ICD-10-PCS | Mod: S$GLB,,, | Performed by: OBSTETRICS & GYNECOLOGY

## 2021-01-13 PROCEDURE — 3008F BODY MASS INDEX DOCD: CPT | Mod: CPTII,S$GLB,, | Performed by: OBSTETRICS & GYNECOLOGY

## 2021-01-13 PROCEDURE — 99213 PR OFFICE/OUTPT VISIT, EST, LEVL III, 20-29 MIN: ICD-10-PCS | Mod: S$GLB,,, | Performed by: OBSTETRICS & GYNECOLOGY

## 2021-01-13 PROCEDURE — 99999 PR PBB SHADOW E&M-EST. PATIENT-LVL III: CPT | Mod: PBBFAC,,, | Performed by: OBSTETRICS & GYNECOLOGY

## 2021-01-13 RX ORDER — PROGESTERONE 200 MG/1
200 CAPSULE ORAL NIGHTLY
Qty: 30 CAPSULE | Refills: 11 | Status: SHIPPED | OUTPATIENT
Start: 2021-01-13 | End: 2021-01-29

## 2021-01-13 RX ORDER — TRETINOIN 0.6 MG/G
GEL TOPICAL
COMMUNITY
Start: 2021-01-12

## 2021-01-14 ENCOUNTER — PATIENT MESSAGE (OUTPATIENT)
Dept: OBSTETRICS AND GYNECOLOGY | Facility: CLINIC | Age: 41
End: 2021-01-14

## 2021-01-19 LAB
B-HCG SERPL-ACNC: <3 MIU/ML
BASOPHILS # BLD AUTO: 28 CELLS/UL (ref 0–200)
BASOPHILS NFR BLD AUTO: 0.4 %
EOSINOPHIL # BLD AUTO: 7 CELLS/UL (ref 15–500)
EOSINOPHIL NFR BLD AUTO: 0.1 %
ERYTHROCYTE [DISTWIDTH] IN BLOOD BY AUTOMATED COUNT: 11.8 % (ref 11–15)
FERRITIN SERPL-MCNC: 206 NG/ML (ref 16–232)
FSH SERPL-ACNC: 3.5 MIU/ML
HCT VFR BLD AUTO: 39.6 % (ref 35–45)
HGB BLD-MCNC: 13.1 G/DL (ref 11.7–15.5)
IRON SATN MFR SERPL: 38 % (CALC) (ref 16–45)
IRON SERPL-MCNC: 131 MCG/DL (ref 40–190)
LYMPHOCYTES # BLD AUTO: 1729 CELLS/UL (ref 850–3900)
LYMPHOCYTES NFR BLD AUTO: 24.7 %
MCH RBC QN AUTO: 31 PG (ref 27–33)
MCHC RBC AUTO-ENTMCNC: 33.1 G/DL (ref 32–36)
MCV RBC AUTO: 93.8 FL (ref 80–100)
MONOCYTES # BLD AUTO: 399 CELLS/UL (ref 200–950)
MONOCYTES NFR BLD AUTO: 5.7 %
NEUTROPHILS # BLD AUTO: 4837 CELLS/UL (ref 1500–7800)
NEUTROPHILS NFR BLD AUTO: 69.1 %
PLATELET # BLD AUTO: 308 THOUSAND/UL (ref 140–400)
PMV BLD REES-ECKER: 10.2 FL (ref 7.5–12.5)
PROLACTIN SERPL-MCNC: 6.5 NG/ML
RBC # BLD AUTO: 4.22 MILLION/UL (ref 3.8–5.1)
T3FREE SERPL-MCNC: 3.5 PG/ML (ref 2.3–4.2)
T3REVERSE SERPL-MCNC: 11 NG/DL (ref 8–25)
T4 FREE SERPL-MCNC: 1.3 NG/DL (ref 0.8–1.8)
THYROPEROXIDASE AB SERPL-ACNC: 1 IU/ML
TIBC SERPL-MCNC: 342 MCG/DL (CALC) (ref 250–450)
TSH SERPL-ACNC: 2.2 MIU/L
WBC # BLD AUTO: 7 THOUSAND/UL (ref 3.8–10.8)

## 2021-01-26 ENCOUNTER — PATIENT MESSAGE (OUTPATIENT)
Dept: OBSTETRICS AND GYNECOLOGY | Facility: CLINIC | Age: 41
End: 2021-01-26

## 2021-01-26 ENCOUNTER — TELEPHONE (OUTPATIENT)
Dept: SPORTS MEDICINE | Facility: CLINIC | Age: 41
End: 2021-01-26

## 2021-01-27 DIAGNOSIS — B37.31 VULVOVAGINAL CANDIDIASIS: ICD-10-CM

## 2021-01-27 RX ORDER — NYSTATIN 100000 U/G
OINTMENT TOPICAL
Qty: 30 G | Refills: 0 | Status: SHIPPED | OUTPATIENT
Start: 2021-01-27 | End: 2022-04-18 | Stop reason: SDUPTHER

## 2021-01-27 RX ORDER — TRIAMCINOLONE ACETONIDE 1 MG/G
OINTMENT TOPICAL
Qty: 30 G | Refills: 0 | Status: SHIPPED | OUTPATIENT
Start: 2021-01-27 | End: 2022-04-18 | Stop reason: SDUPTHER

## 2021-01-29 RX ORDER — PROGESTERONE 100 MG/1
100 CAPSULE ORAL NIGHTLY
Qty: 30 CAPSULE | Refills: 10 | Status: SHIPPED | OUTPATIENT
Start: 2021-01-29 | End: 2021-05-02

## 2021-02-03 ENCOUNTER — HOSPITAL ENCOUNTER (OUTPATIENT)
Dept: RADIOLOGY | Facility: HOSPITAL | Age: 41
Discharge: HOME | End: 2021-02-03
Attending: ORTHOPAEDIC SURGERY
Payer: COMMERCIAL

## 2021-02-03 ENCOUNTER — OFFICE VISIT (OUTPATIENT)
Dept: SPORTS MEDICINE | Facility: CLINIC | Age: 41
End: 2021-02-03
Payer: COMMERCIAL

## 2021-02-03 VITALS
HEART RATE: 66 BPM | HEIGHT: 68 IN | SYSTOLIC BLOOD PRESSURE: 126 MMHG | DIASTOLIC BLOOD PRESSURE: 87 MMHG | BODY MASS INDEX: 21.07 KG/M2 | WEIGHT: 139 LBS

## 2021-02-03 DIAGNOSIS — M25.552 LEFT HIP PAIN: Primary | ICD-10-CM

## 2021-02-03 DIAGNOSIS — M25.552 LEFT HIP PAIN: ICD-10-CM

## 2021-02-03 PROCEDURE — 1125F AMNT PAIN NOTED PAIN PRSNT: CPT | Mod: S$GLB,,, | Performed by: ORTHOPAEDIC SURGERY

## 2021-02-03 PROCEDURE — 73503 XR HIP 4 OR MORE VIEWS LEFT: ICD-10-PCS | Mod: 26,LT,, | Performed by: RADIOLOGY

## 2021-02-03 PROCEDURE — 73503 X-RAY EXAM HIP UNI 4/> VIEWS: CPT | Mod: 26,LT,, | Performed by: RADIOLOGY

## 2021-02-03 PROCEDURE — 99203 OFFICE O/P NEW LOW 30 MIN: CPT | Mod: S$GLB,,, | Performed by: ORTHOPAEDIC SURGERY

## 2021-02-03 PROCEDURE — 73503 X-RAY EXAM HIP UNI 4/> VIEWS: CPT | Mod: TC,LT

## 2021-02-03 PROCEDURE — 1125F PR PAIN SEVERITY QUANTIFIED, PAIN PRESENT: ICD-10-PCS | Mod: S$GLB,,, | Performed by: ORTHOPAEDIC SURGERY

## 2021-02-03 PROCEDURE — 99203 PR OFFICE/OUTPT VISIT, NEW, LEVL III, 30-44 MIN: ICD-10-PCS | Mod: S$GLB,,, | Performed by: ORTHOPAEDIC SURGERY

## 2021-02-03 PROCEDURE — 3008F BODY MASS INDEX DOCD: CPT | Mod: CPTII,S$GLB,, | Performed by: ORTHOPAEDIC SURGERY

## 2021-02-03 PROCEDURE — 3008F PR BODY MASS INDEX (BMI) DOCUMENTED: ICD-10-PCS | Mod: CPTII,S$GLB,, | Performed by: ORTHOPAEDIC SURGERY

## 2021-02-03 PROCEDURE — 99999 PR PBB SHADOW E&M-EST. PATIENT-LVL III: CPT | Mod: PBBFAC,,, | Performed by: ORTHOPAEDIC SURGERY

## 2021-02-03 PROCEDURE — 99999 PR PBB SHADOW E&M-EST. PATIENT-LVL III: ICD-10-PCS | Mod: PBBFAC,,, | Performed by: ORTHOPAEDIC SURGERY

## 2021-02-09 ENCOUNTER — PATIENT MESSAGE (OUTPATIENT)
Dept: OBSTETRICS AND GYNECOLOGY | Facility: CLINIC | Age: 41
End: 2021-02-09

## 2021-02-09 DIAGNOSIS — M25.552 LEFT HIP PAIN: Primary | ICD-10-CM

## 2021-02-11 ENCOUNTER — PATIENT MESSAGE (OUTPATIENT)
Dept: OBSTETRICS AND GYNECOLOGY | Facility: CLINIC | Age: 41
End: 2021-02-11

## 2021-02-15 ENCOUNTER — TELEPHONE (OUTPATIENT)
Dept: OBSTETRICS AND GYNECOLOGY | Facility: CLINIC | Age: 41
End: 2021-02-15

## 2021-02-18 DIAGNOSIS — N92.6 IRREGULAR BLEEDING: Primary | ICD-10-CM

## 2021-02-23 ENCOUNTER — PROCEDURE VISIT (OUTPATIENT)
Dept: OBSTETRICS AND GYNECOLOGY | Facility: CLINIC | Age: 41
End: 2021-02-23
Attending: OBSTETRICS & GYNECOLOGY
Payer: COMMERCIAL

## 2021-02-23 DIAGNOSIS — Z01.812 PRE-PROCEDURE LAB EXAM: ICD-10-CM

## 2021-02-23 DIAGNOSIS — N92.6 IRREGULAR MENSTRUAL BLEEDING: Primary | ICD-10-CM

## 2021-02-23 LAB
B-HCG UR QL: NEGATIVE
CTP QC/QA: YES

## 2021-03-01 ENCOUNTER — PATIENT MESSAGE (OUTPATIENT)
Dept: OBSTETRICS AND GYNECOLOGY | Facility: CLINIC | Age: 41
End: 2021-03-01

## 2021-03-24 ENCOUNTER — CLINICAL SUPPORT (OUTPATIENT)
Dept: REHABILITATION | Facility: OTHER | Age: 41
End: 2021-03-24
Attending: OBSTETRICS & GYNECOLOGY
Payer: COMMERCIAL

## 2021-03-24 DIAGNOSIS — M25.552 PAIN OF LEFT HIP JOINT: ICD-10-CM

## 2021-03-24 DIAGNOSIS — N39.3 STRESS INCONTINENCE: ICD-10-CM

## 2021-03-24 DIAGNOSIS — M62.89 PELVIC FLOOR TENSION: ICD-10-CM

## 2021-03-24 DIAGNOSIS — R35.1 NOCTURIA MORE THAN TWICE PER NIGHT: ICD-10-CM

## 2021-03-24 DIAGNOSIS — M25.552 LEFT HIP PAIN: ICD-10-CM

## 2021-03-24 PROCEDURE — 97162 PT EVAL MOD COMPLEX 30 MIN: CPT

## 2021-03-24 PROCEDURE — 97112 NEUROMUSCULAR REEDUCATION: CPT

## 2021-03-25 PROBLEM — N39.3 STRESS INCONTINENCE: Status: ACTIVE | Noted: 2021-03-25

## 2021-03-25 PROBLEM — M62.89 PELVIC FLOOR TENSION: Status: ACTIVE | Noted: 2021-03-25

## 2021-03-25 PROBLEM — R35.1 NOCTURIA MORE THAN TWICE PER NIGHT: Status: ACTIVE | Noted: 2021-03-25

## 2021-03-25 PROBLEM — M25.559 HIP JOINT PAIN: Status: ACTIVE | Noted: 2021-03-25

## 2021-04-16 ENCOUNTER — PATIENT MESSAGE (OUTPATIENT)
Dept: RESEARCH | Facility: HOSPITAL | Age: 41
End: 2021-04-16

## 2021-04-27 ENCOUNTER — PATIENT MESSAGE (OUTPATIENT)
Dept: INFECTIOUS DISEASES | Facility: CLINIC | Age: 41
End: 2021-04-27

## 2021-04-27 ENCOUNTER — PATIENT MESSAGE (OUTPATIENT)
Dept: OBSTETRICS AND GYNECOLOGY | Facility: CLINIC | Age: 41
End: 2021-04-27

## 2021-04-27 RX ORDER — FLUCONAZOLE 150 MG/1
150 TABLET ORAL DAILY
Qty: 5 TABLET | Refills: 0 | Status: SHIPPED | OUTPATIENT
Start: 2021-04-27 | End: 2021-05-02

## 2021-04-28 ENCOUNTER — OFFICE VISIT (OUTPATIENT)
Dept: INFECTIOUS DISEASES | Facility: CLINIC | Age: 41
End: 2021-04-28
Payer: COMMERCIAL

## 2021-04-28 VITALS
SYSTOLIC BLOOD PRESSURE: 110 MMHG | WEIGHT: 138.25 LBS | TEMPERATURE: 98 F | HEIGHT: 68 IN | DIASTOLIC BLOOD PRESSURE: 74 MMHG | BODY MASS INDEX: 20.95 KG/M2 | HEART RATE: 80 BPM

## 2021-04-28 DIAGNOSIS — Z29.89 NEED FOR MALARIA PROPHYLAXIS: ICD-10-CM

## 2021-04-28 DIAGNOSIS — Z71.84 COUNSELING ABOUT TRAVEL: ICD-10-CM

## 2021-04-28 PROCEDURE — 3008F PR BODY MASS INDEX (BMI) DOCUMENTED: ICD-10-PCS | Mod: CPTII,S$GLB,, | Performed by: INTERNAL MEDICINE

## 2021-04-28 PROCEDURE — 99401 PR PREVENT COUNSEL,INDIV,15 MIN: ICD-10-PCS | Mod: S$GLB,,, | Performed by: INTERNAL MEDICINE

## 2021-04-28 PROCEDURE — 1126F AMNT PAIN NOTED NONE PRSNT: CPT | Mod: S$GLB,,, | Performed by: INTERNAL MEDICINE

## 2021-04-28 PROCEDURE — 99999 PR PBB SHADOW E&M-EST. PATIENT-LVL III: ICD-10-PCS | Mod: PBBFAC,,, | Performed by: INTERNAL MEDICINE

## 2021-04-28 PROCEDURE — 99401 PREV MED CNSL INDIV APPRX 15: CPT | Mod: S$GLB,,, | Performed by: INTERNAL MEDICINE

## 2021-04-28 PROCEDURE — 99999 PR PBB SHADOW E&M-EST. PATIENT-LVL III: CPT | Mod: PBBFAC,,, | Performed by: INTERNAL MEDICINE

## 2021-04-28 PROCEDURE — 1126F PR PAIN SEVERITY QUANTIFIED, NO PAIN PRESENT: ICD-10-PCS | Mod: S$GLB,,, | Performed by: INTERNAL MEDICINE

## 2021-04-28 PROCEDURE — 3008F BODY MASS INDEX DOCD: CPT | Mod: CPTII,S$GLB,, | Performed by: INTERNAL MEDICINE

## 2021-04-28 RX ORDER — AZITHROMYCIN 500 MG/1
500 TABLET, FILM COATED ORAL DAILY
Qty: 3 TABLET | Refills: 0 | Status: SHIPPED | OUTPATIENT
Start: 2021-04-28 | End: 2021-05-01

## 2021-04-28 RX ORDER — ATOVAQUONE AND PROGUANIL HYDROCHLORIDE 250; 100 MG/1; MG/1
1 TABLET, FILM COATED ORAL DAILY
Qty: 40 TABLET | Refills: 0 | Status: SHIPPED | OUTPATIENT
Start: 2021-04-28 | End: 2022-05-18

## 2021-05-05 ENCOUNTER — PATIENT MESSAGE (OUTPATIENT)
Dept: OBSTETRICS AND GYNECOLOGY | Facility: CLINIC | Age: 41
End: 2021-05-05

## 2021-05-13 ENCOUNTER — CLINICAL SUPPORT (OUTPATIENT)
Dept: REHABILITATION | Facility: OTHER | Age: 41
End: 2021-05-13
Attending: OBSTETRICS & GYNECOLOGY
Payer: COMMERCIAL

## 2021-05-13 DIAGNOSIS — N39.3 STRESS INCONTINENCE: ICD-10-CM

## 2021-05-13 DIAGNOSIS — M25.559 ARTHRALGIA OF HIP, UNSPECIFIED LATERALITY: ICD-10-CM

## 2021-05-13 DIAGNOSIS — R35.1 NOCTURIA MORE THAN TWICE PER NIGHT: ICD-10-CM

## 2021-05-13 DIAGNOSIS — M62.89 PELVIC FLOOR TENSION: Primary | ICD-10-CM

## 2021-05-13 PROCEDURE — 97530 THERAPEUTIC ACTIVITIES: CPT

## 2021-05-13 PROCEDURE — 97110 THERAPEUTIC EXERCISES: CPT

## 2021-05-13 PROCEDURE — 97112 NEUROMUSCULAR REEDUCATION: CPT

## 2021-05-27 ENCOUNTER — CLINICAL SUPPORT (OUTPATIENT)
Dept: REHABILITATION | Facility: OTHER | Age: 41
End: 2021-05-27
Attending: OBSTETRICS & GYNECOLOGY
Payer: COMMERCIAL

## 2021-05-27 DIAGNOSIS — M25.559 ARTHRALGIA OF HIP, UNSPECIFIED LATERALITY: ICD-10-CM

## 2021-05-27 DIAGNOSIS — N39.3 STRESS INCONTINENCE: ICD-10-CM

## 2021-05-27 DIAGNOSIS — R35.1 NOCTURIA MORE THAN TWICE PER NIGHT: ICD-10-CM

## 2021-05-27 DIAGNOSIS — M62.89 PELVIC FLOOR TENSION: Primary | ICD-10-CM

## 2021-05-27 PROCEDURE — 97112 NEUROMUSCULAR REEDUCATION: CPT

## 2021-05-27 PROCEDURE — 97110 THERAPEUTIC EXERCISES: CPT

## 2021-07-01 ENCOUNTER — CLINICAL SUPPORT (OUTPATIENT)
Dept: REHABILITATION | Facility: OTHER | Age: 41
End: 2021-07-01
Attending: OBSTETRICS & GYNECOLOGY
Payer: COMMERCIAL

## 2021-07-01 DIAGNOSIS — R35.1 NOCTURIA MORE THAN TWICE PER NIGHT: ICD-10-CM

## 2021-07-01 DIAGNOSIS — M25.559 ARTHRALGIA OF HIP, UNSPECIFIED LATERALITY: ICD-10-CM

## 2021-07-01 DIAGNOSIS — M62.89 PELVIC FLOOR TENSION: Primary | ICD-10-CM

## 2021-07-01 DIAGNOSIS — N39.3 STRESS INCONTINENCE: ICD-10-CM

## 2021-07-01 PROCEDURE — 97112 NEUROMUSCULAR REEDUCATION: CPT

## 2021-07-08 ENCOUNTER — CLINICAL SUPPORT (OUTPATIENT)
Dept: REHABILITATION | Facility: OTHER | Age: 41
End: 2021-07-08
Payer: COMMERCIAL

## 2021-07-08 DIAGNOSIS — M25.559 ARTHRALGIA OF HIP, UNSPECIFIED LATERALITY: ICD-10-CM

## 2021-07-08 DIAGNOSIS — R35.1 NOCTURIA MORE THAN TWICE PER NIGHT: ICD-10-CM

## 2021-07-08 DIAGNOSIS — N39.3 STRESS INCONTINENCE: ICD-10-CM

## 2021-07-08 DIAGNOSIS — M62.89 PELVIC FLOOR TENSION: Primary | ICD-10-CM

## 2021-07-08 PROCEDURE — 97112 NEUROMUSCULAR REEDUCATION: CPT

## 2021-08-01 ENCOUNTER — OFFICE VISIT (OUTPATIENT)
Dept: URGENT CARE | Facility: CLINIC | Age: 41
End: 2021-08-01
Payer: COMMERCIAL

## 2021-08-01 VITALS
HEART RATE: 60 BPM | BODY MASS INDEX: 20.76 KG/M2 | OXYGEN SATURATION: 99 % | HEIGHT: 68 IN | TEMPERATURE: 98 F | RESPIRATION RATE: 18 BRPM | WEIGHT: 137 LBS | DIASTOLIC BLOOD PRESSURE: 87 MMHG | SYSTOLIC BLOOD PRESSURE: 135 MMHG

## 2021-08-01 DIAGNOSIS — R05.9 COUGH: Primary | ICD-10-CM

## 2021-08-01 DIAGNOSIS — J06.9 UPPER RESPIRATORY TRACT INFECTION, UNSPECIFIED TYPE: ICD-10-CM

## 2021-08-01 LAB
CTP QC/QA: YES
SARS-COV-2 RDRP RESP QL NAA+PROBE: NEGATIVE

## 2021-08-01 PROCEDURE — 1160F PR REVIEW ALL MEDS BY PRESCRIBER/CLIN PHARMACIST DOCUMENTED: ICD-10-PCS | Mod: CPTII,S$GLB,, | Performed by: NURSE PRACTITIONER

## 2021-08-01 PROCEDURE — 3075F PR MOST RECENT SYSTOLIC BLOOD PRESS GE 130-139MM HG: ICD-10-PCS | Mod: CPTII,S$GLB,, | Performed by: NURSE PRACTITIONER

## 2021-08-01 PROCEDURE — 1159F MED LIST DOCD IN RCRD: CPT | Mod: CPTII,S$GLB,, | Performed by: NURSE PRACTITIONER

## 2021-08-01 PROCEDURE — 99213 PR OFFICE/OUTPT VISIT, EST, LEVL III, 20-29 MIN: ICD-10-PCS | Mod: S$GLB,CS,, | Performed by: NURSE PRACTITIONER

## 2021-08-01 PROCEDURE — U0002: ICD-10-PCS | Mod: QW,S$GLB,, | Performed by: NURSE PRACTITIONER

## 2021-08-01 PROCEDURE — 3079F DIAST BP 80-89 MM HG: CPT | Mod: CPTII,S$GLB,, | Performed by: NURSE PRACTITIONER

## 2021-08-01 PROCEDURE — 1159F PR MEDICATION LIST DOCUMENTED IN MEDICAL RECORD: ICD-10-PCS | Mod: CPTII,S$GLB,, | Performed by: NURSE PRACTITIONER

## 2021-08-01 PROCEDURE — 3008F BODY MASS INDEX DOCD: CPT | Mod: CPTII,S$GLB,, | Performed by: NURSE PRACTITIONER

## 2021-08-01 PROCEDURE — 99213 OFFICE O/P EST LOW 20 MIN: CPT | Mod: S$GLB,CS,, | Performed by: NURSE PRACTITIONER

## 2021-08-01 PROCEDURE — 3008F PR BODY MASS INDEX (BMI) DOCUMENTED: ICD-10-PCS | Mod: CPTII,S$GLB,, | Performed by: NURSE PRACTITIONER

## 2021-08-01 PROCEDURE — 1160F RVW MEDS BY RX/DR IN RCRD: CPT | Mod: CPTII,S$GLB,, | Performed by: NURSE PRACTITIONER

## 2021-08-01 PROCEDURE — 3079F PR MOST RECENT DIASTOLIC BLOOD PRESSURE 80-89 MM HG: ICD-10-PCS | Mod: CPTII,S$GLB,, | Performed by: NURSE PRACTITIONER

## 2021-08-01 PROCEDURE — U0002 COVID-19 LAB TEST NON-CDC: HCPCS | Mod: QW,S$GLB,, | Performed by: NURSE PRACTITIONER

## 2021-08-01 PROCEDURE — 3075F SYST BP GE 130 - 139MM HG: CPT | Mod: CPTII,S$GLB,, | Performed by: NURSE PRACTITIONER

## 2021-08-01 RX ORDER — DAPSONE 50 MG/G
GEL TOPICAL 2 TIMES DAILY
COMMUNITY
End: 2023-10-13

## 2021-08-27 ENCOUNTER — OFFICE VISIT (OUTPATIENT)
Dept: OBSTETRICS AND GYNECOLOGY | Facility: CLINIC | Age: 41
End: 2021-08-27
Attending: OBSTETRICS & GYNECOLOGY
Payer: COMMERCIAL

## 2021-08-27 VITALS
WEIGHT: 140.19 LBS | DIASTOLIC BLOOD PRESSURE: 80 MMHG | BODY MASS INDEX: 21.25 KG/M2 | HEIGHT: 68 IN | SYSTOLIC BLOOD PRESSURE: 112 MMHG

## 2021-08-27 DIAGNOSIS — Z12.31 VISIT FOR SCREENING MAMMOGRAM: ICD-10-CM

## 2021-08-27 DIAGNOSIS — Z01.419 WELL FEMALE EXAM WITH ROUTINE GYNECOLOGICAL EXAM: Primary | ICD-10-CM

## 2021-08-27 DIAGNOSIS — Z11.51 SCREENING FOR HUMAN PAPILLOMAVIRUS: ICD-10-CM

## 2021-08-27 PROCEDURE — 99396 PR PREVENTIVE VISIT,EST,40-64: ICD-10-PCS | Mod: S$GLB,,, | Performed by: OBSTETRICS & GYNECOLOGY

## 2021-08-27 PROCEDURE — 3008F BODY MASS INDEX DOCD: CPT | Mod: CPTII,S$GLB,, | Performed by: OBSTETRICS & GYNECOLOGY

## 2021-08-27 PROCEDURE — 1159F MED LIST DOCD IN RCRD: CPT | Mod: CPTII,S$GLB,, | Performed by: OBSTETRICS & GYNECOLOGY

## 2021-08-27 PROCEDURE — 3074F PR MOST RECENT SYSTOLIC BLOOD PRESSURE < 130 MM HG: ICD-10-PCS | Mod: CPTII,S$GLB,, | Performed by: OBSTETRICS & GYNECOLOGY

## 2021-08-27 PROCEDURE — 99999 PR PBB SHADOW E&M-EST. PATIENT-LVL IV: ICD-10-PCS | Mod: PBBFAC,,, | Performed by: OBSTETRICS & GYNECOLOGY

## 2021-08-27 PROCEDURE — 1159F PR MEDICATION LIST DOCUMENTED IN MEDICAL RECORD: ICD-10-PCS | Mod: CPTII,S$GLB,, | Performed by: OBSTETRICS & GYNECOLOGY

## 2021-08-27 PROCEDURE — 1126F AMNT PAIN NOTED NONE PRSNT: CPT | Mod: CPTII,S$GLB,, | Performed by: OBSTETRICS & GYNECOLOGY

## 2021-08-27 PROCEDURE — 99999 PR PBB SHADOW E&M-EST. PATIENT-LVL IV: CPT | Mod: PBBFAC,,, | Performed by: OBSTETRICS & GYNECOLOGY

## 2021-08-27 PROCEDURE — 3079F PR MOST RECENT DIASTOLIC BLOOD PRESSURE 80-89 MM HG: ICD-10-PCS | Mod: CPTII,S$GLB,, | Performed by: OBSTETRICS & GYNECOLOGY

## 2021-08-27 PROCEDURE — 87624 HPV HI-RISK TYP POOLED RSLT: CPT | Performed by: OBSTETRICS & GYNECOLOGY

## 2021-08-27 PROCEDURE — 1126F PR PAIN SEVERITY QUANTIFIED, NO PAIN PRESENT: ICD-10-PCS | Mod: CPTII,S$GLB,, | Performed by: OBSTETRICS & GYNECOLOGY

## 2021-08-27 PROCEDURE — 99396 PREV VISIT EST AGE 40-64: CPT | Mod: S$GLB,,, | Performed by: OBSTETRICS & GYNECOLOGY

## 2021-08-27 PROCEDURE — 3008F PR BODY MASS INDEX (BMI) DOCUMENTED: ICD-10-PCS | Mod: CPTII,S$GLB,, | Performed by: OBSTETRICS & GYNECOLOGY

## 2021-08-27 PROCEDURE — 1160F RVW MEDS BY RX/DR IN RCRD: CPT | Mod: CPTII,S$GLB,, | Performed by: OBSTETRICS & GYNECOLOGY

## 2021-08-27 PROCEDURE — 1160F PR REVIEW ALL MEDS BY PRESCRIBER/CLIN PHARMACIST DOCUMENTED: ICD-10-PCS | Mod: CPTII,S$GLB,, | Performed by: OBSTETRICS & GYNECOLOGY

## 2021-08-27 PROCEDURE — 3074F SYST BP LT 130 MM HG: CPT | Mod: CPTII,S$GLB,, | Performed by: OBSTETRICS & GYNECOLOGY

## 2021-08-27 PROCEDURE — 88175 CYTOPATH C/V AUTO FLUID REDO: CPT | Performed by: OBSTETRICS & GYNECOLOGY

## 2021-08-27 PROCEDURE — 3079F DIAST BP 80-89 MM HG: CPT | Mod: CPTII,S$GLB,, | Performed by: OBSTETRICS & GYNECOLOGY

## 2021-08-27 RX ORDER — CLINDAMYCIN PHOSPHATE 10 MG/ML
1 SOLUTION TOPICAL 2 TIMES DAILY
COMMUNITY
Start: 2021-07-13

## 2021-08-27 RX ORDER — IVERMECTIN 10 MG/G
CREAM TOPICAL
COMMUNITY
Start: 2021-07-13

## 2021-09-07 LAB
CLINICAL INFO: NORMAL
CYTO CVX: NORMAL
CYTOLOGIST CVX/VAG CYTO: NORMAL
CYTOLOGIST CVX/VAG CYTO: NORMAL
CYTOLOGY CMNT CVX/VAG CYTO-IMP: NORMAL
CYTOLOGY PAP THIN PREP EXPLANATION: NORMAL
DATE OF PREVIOUS PAP: NORMAL
DATE PREVIOUS BX: YES
GEN CATEG CVX/VAG CYTO-IMP: NORMAL
HPV I/H RISK 4 DNA CVX QL NAA+PROBE: NOT DETECTED
LMP START DATE: NORMAL
MICROORGANISM CVX/VAG CYTO: NORMAL
PATHOLOGIST CVX/VAG CYTO: NORMAL
SERVICE CMNT-IMP: NORMAL
SPECIMEN SOURCE CVX/VAG CYTO: NORMAL
STAT OF ADQ CVX/VAG CYTO-IMP: NORMAL

## 2021-11-11 ENCOUNTER — HOSPITAL ENCOUNTER (OUTPATIENT)
Dept: RADIOLOGY | Facility: OTHER | Age: 41
Discharge: HOME | End: 2021-11-11
Attending: OBSTETRICS & GYNECOLOGY
Payer: COMMERCIAL

## 2021-11-11 DIAGNOSIS — Z12.31 VISIT FOR SCREENING MAMMOGRAM: ICD-10-CM

## 2021-11-11 PROCEDURE — 77067 SCR MAMMO BI INCL CAD: CPT | Mod: TC

## 2021-11-11 PROCEDURE — 77067 SCR MAMMO BI INCL CAD: CPT | Mod: 26,,, | Performed by: RADIOLOGY

## 2021-11-11 PROCEDURE — 77063 BREAST TOMOSYNTHESIS BI: CPT | Mod: 26,,, | Performed by: RADIOLOGY

## 2021-11-11 PROCEDURE — 77063 MAMMO DIGITAL SCREENING BILAT WITH TOMO: ICD-10-PCS | Mod: 26,,, | Performed by: RADIOLOGY

## 2021-11-11 PROCEDURE — 77067 MAMMO DIGITAL SCREENING BILAT WITH TOMO: ICD-10-PCS | Mod: 26,,, | Performed by: RADIOLOGY

## 2021-11-21 ENCOUNTER — PATIENT MESSAGE (OUTPATIENT)
Dept: OBSTETRICS AND GYNECOLOGY | Facility: CLINIC | Age: 41
End: 2021-11-21
Payer: COMMERCIAL

## 2022-02-08 RX ORDER — FLUCONAZOLE 150 MG/1
150 TABLET ORAL DAILY
Qty: 2 TABLET | Refills: 2 | Status: SHIPPED | OUTPATIENT
Start: 2022-02-08 | End: 2022-02-09

## 2022-04-18 ENCOUNTER — PATIENT MESSAGE (OUTPATIENT)
Dept: OBSTETRICS AND GYNECOLOGY | Facility: CLINIC | Age: 42
End: 2022-04-18
Payer: COMMERCIAL

## 2022-04-18 ENCOUNTER — TELEPHONE (OUTPATIENT)
Dept: OBSTETRICS AND GYNECOLOGY | Facility: CLINIC | Age: 42
End: 2022-04-18
Payer: COMMERCIAL

## 2022-04-18 ENCOUNTER — TELEPHONE (OUTPATIENT)
Dept: INFECTIOUS DISEASES | Facility: CLINIC | Age: 42
End: 2022-04-18
Payer: COMMERCIAL

## 2022-04-18 DIAGNOSIS — Z12.31 VISIT FOR SCREENING MAMMOGRAM: Primary | ICD-10-CM

## 2022-04-18 RX ORDER — ATOVAQUONE AND PROGUANIL HYDROCHLORIDE 250; 100 MG/1; MG/1
1 TABLET, FILM COATED ORAL DAILY
Qty: 30 TABLET | Refills: 0 | Status: SHIPPED | OUTPATIENT
Start: 2022-04-18 | End: 2022-05-18

## 2022-04-18 NOTE — TELEPHONE ENCOUNTER
I spoke with patient. Patient was advised that since  is the ordering physician, she should call her clinic. Patient verbalized agreement.

## 2022-04-18 NOTE — TELEPHONE ENCOUNTER
----- Message from Eduar Aranda sent at 4/18/2022  1:53 PM CDT -----  Contact: @ [phone]  Patient calling to get an update on the mammogram order, pls advise or upload

## 2022-05-18 ENCOUNTER — OFFICE VISIT (OUTPATIENT)
Dept: INFECTIOUS DISEASES | Facility: CLINIC | Age: 42
End: 2022-05-18
Payer: COMMERCIAL

## 2022-05-18 VITALS
WEIGHT: 136.44 LBS | SYSTOLIC BLOOD PRESSURE: 132 MMHG | HEART RATE: 64 BPM | BODY MASS INDEX: 20.75 KG/M2 | TEMPERATURE: 98 F | DIASTOLIC BLOOD PRESSURE: 82 MMHG | RESPIRATION RATE: 17 BRPM

## 2022-05-18 DIAGNOSIS — Z23 IMMUNIZATION DUE: ICD-10-CM

## 2022-05-18 DIAGNOSIS — Z71.84 COUNSELING ABOUT TRAVEL: Primary | ICD-10-CM

## 2022-05-18 PROCEDURE — 1159F MED LIST DOCD IN RCRD: CPT | Mod: CPTII,S$GLB,, | Performed by: INTERNAL MEDICINE

## 2022-05-18 PROCEDURE — 1159F PR MEDICATION LIST DOCUMENTED IN MEDICAL RECORD: ICD-10-PCS | Mod: CPTII,S$GLB,, | Performed by: INTERNAL MEDICINE

## 2022-05-18 PROCEDURE — 99999 PR PBB SHADOW E&M-EST. PATIENT-LVL IV: ICD-10-PCS | Mod: PBBFAC,,, | Performed by: INTERNAL MEDICINE

## 2022-05-18 PROCEDURE — 3079F PR MOST RECENT DIASTOLIC BLOOD PRESSURE 80-89 MM HG: ICD-10-PCS | Mod: CPTII,S$GLB,, | Performed by: INTERNAL MEDICINE

## 2022-05-18 PROCEDURE — 99401 PR PREVENT COUNSEL,INDIV,15 MIN: ICD-10-PCS | Mod: S$GLB,,, | Performed by: INTERNAL MEDICINE

## 2022-05-18 PROCEDURE — 99999 PR PBB SHADOW E&M-EST. PATIENT-LVL IV: CPT | Mod: PBBFAC,,, | Performed by: INTERNAL MEDICINE

## 2022-05-18 PROCEDURE — 99401 PREV MED CNSL INDIV APPRX 15: CPT | Mod: S$GLB,,, | Performed by: INTERNAL MEDICINE

## 2022-05-18 PROCEDURE — 3079F DIAST BP 80-89 MM HG: CPT | Mod: CPTII,S$GLB,, | Performed by: INTERNAL MEDICINE

## 2022-05-18 PROCEDURE — 3008F PR BODY MASS INDEX (BMI) DOCUMENTED: ICD-10-PCS | Mod: CPTII,S$GLB,, | Performed by: INTERNAL MEDICINE

## 2022-05-18 PROCEDURE — 1160F RVW MEDS BY RX/DR IN RCRD: CPT | Mod: CPTII,S$GLB,, | Performed by: INTERNAL MEDICINE

## 2022-05-18 PROCEDURE — 3075F SYST BP GE 130 - 139MM HG: CPT | Mod: CPTII,S$GLB,, | Performed by: INTERNAL MEDICINE

## 2022-05-18 PROCEDURE — 3008F BODY MASS INDEX DOCD: CPT | Mod: CPTII,S$GLB,, | Performed by: INTERNAL MEDICINE

## 2022-05-18 PROCEDURE — 1160F PR REVIEW ALL MEDS BY PRESCRIBER/CLIN PHARMACIST DOCUMENTED: ICD-10-PCS | Mod: CPTII,S$GLB,, | Performed by: INTERNAL MEDICINE

## 2022-05-18 PROCEDURE — 3075F PR MOST RECENT SYSTOLIC BLOOD PRESS GE 130-139MM HG: ICD-10-PCS | Mod: CPTII,S$GLB,, | Performed by: INTERNAL MEDICINE

## 2022-05-18 RX ORDER — ATOVAQUONE AND PROGUANIL HYDROCHLORIDE 250; 100 MG/1; MG/1
TABLET, FILM COATED ORAL
Qty: 40 TABLET | Refills: 0 | Status: SHIPPED | OUTPATIENT
Start: 2022-05-18 | End: 2023-10-13

## 2022-05-18 RX ORDER — AZITHROMYCIN 500 MG/1
500 TABLET, FILM COATED ORAL DAILY
Qty: 3 TABLET | Refills: 0 | Status: SHIPPED | OUTPATIENT
Start: 2022-05-18 | End: 2022-05-21

## 2022-05-18 NOTE — PROGRESS NOTES
Subjective:      Chief Complaint:   Chief Complaint   Patient presents with    Travel Consult     [facility]      History of Present Illness    Patient  41 y.o. female who presents today for routine pretravel consultation.  The patient reports no past medical history.  The patient reports the following medication allergies; sulfa.  The patient reports the following food allergies; none .  The patient will be traveling to [facility] on June 19.  The patient will be at this destination for 30 days.  The patient will be lodging at a camping lodge.  The patient has travelled to the following other countries in the past; [facility].  The patient reports that they received all their childhood vaccinations.  The patient reports receipt of the following travel related vaccinations; hepatitis a, hepatitis b, tetanus, typhoid in 2020.  The purpose of this trip is vacation.      Review of Systems   All other systems reviewed and are negative.      Objective:   Physical Exam   Assessment:     Pre-Travel clinic assessment    Plan:   Patient specific risks:      Patient does not have any medical problems that would restrict her travel.    Destination specific risks:      -Infectious Disease risks:       Mosquito Borne pathogens:  Reviewed basic mosquito avoidance precautions including wearing long sleeve clothing and insect repellant.  Malarone for malaria prevention was prescribed.     Food Borne pathogens:  Reviewed basic hand, food and water sanitation precautions.  Patient instructed to take hand  on their trip.  She is up to date on hepatitis A and typhoid IM vaccination.  Azithromycin as needed for severe diarrhea.     Routine:  She is up to date on tetanus vaccination.  She has received pfizer covid vaccination X2.

## 2022-05-23 ENCOUNTER — OFFICE VISIT (OUTPATIENT)
Dept: HEMATOLOGY/ONCOLOGY | Facility: CLINIC | Age: 42
End: 2022-05-23
Payer: COMMERCIAL

## 2022-05-23 ENCOUNTER — HOSPITAL ENCOUNTER (OUTPATIENT)
Dept: RADIOLOGY | Facility: HOSPITAL | Age: 42
Discharge: HOME | End: 2022-05-23
Attending: NURSE PRACTITIONER
Payer: COMMERCIAL

## 2022-05-23 VITALS
BODY MASS INDEX: 21.6 KG/M2 | OXYGEN SATURATION: 97 % | RESPIRATION RATE: 18 BRPM | SYSTOLIC BLOOD PRESSURE: 126 MMHG | DIASTOLIC BLOOD PRESSURE: 85 MMHG | HEIGHT: 68 IN | WEIGHT: 142.5 LBS | TEMPERATURE: 98 F | HEART RATE: 66 BPM

## 2022-05-23 DIAGNOSIS — Z91.89 AT HIGH RISK FOR BREAST CANCER: Primary | ICD-10-CM

## 2022-05-23 DIAGNOSIS — Z80.3 FAMILY HISTORY OF BREAST CANCER: ICD-10-CM

## 2022-05-23 DIAGNOSIS — Z91.89 AT HIGH RISK FOR BREAST CANCER: ICD-10-CM

## 2022-05-23 DIAGNOSIS — R92.30 DENSE BREAST TISSUE ON MAMMOGRAM: ICD-10-CM

## 2022-05-23 PROCEDURE — 1159F PR MEDICATION LIST DOCUMENTED IN MEDICAL RECORD: ICD-10-PCS | Mod: CPTII,S$GLB,, | Performed by: NURSE PRACTITIONER

## 2022-05-23 PROCEDURE — 3074F SYST BP LT 130 MM HG: CPT | Mod: CPTII,S$GLB,, | Performed by: NURSE PRACTITIONER

## 2022-05-23 PROCEDURE — 99999 PR PBB SHADOW E&M-EST. PATIENT-LVL V: ICD-10-PCS | Mod: PBBFAC,,, | Performed by: NURSE PRACTITIONER

## 2022-05-23 PROCEDURE — 3079F DIAST BP 80-89 MM HG: CPT | Mod: CPTII,S$GLB,, | Performed by: NURSE PRACTITIONER

## 2022-05-23 PROCEDURE — 3074F PR MOST RECENT SYSTOLIC BLOOD PRESSURE < 130 MM HG: ICD-10-PCS | Mod: CPTII,S$GLB,, | Performed by: NURSE PRACTITIONER

## 2022-05-23 PROCEDURE — 3008F PR BODY MASS INDEX (BMI) DOCUMENTED: ICD-10-PCS | Mod: CPTII,S$GLB,, | Performed by: NURSE PRACTITIONER

## 2022-05-23 PROCEDURE — 99205 PR OFFICE/OUTPT VISIT, NEW, LEVL V, 60-74 MIN: ICD-10-PCS | Mod: S$GLB,,, | Performed by: NURSE PRACTITIONER

## 2022-05-23 PROCEDURE — 99205 OFFICE O/P NEW HI 60 MIN: CPT | Mod: S$GLB,,, | Performed by: NURSE PRACTITIONER

## 2022-05-23 PROCEDURE — 77049 MRI BREAST C-+ W/CAD BI: CPT | Mod: TC

## 2022-05-23 PROCEDURE — 25500020 PHARM REV CODE 255: Performed by: NURSE PRACTITIONER

## 2022-05-23 PROCEDURE — A9577 INJ MULTIHANCE: HCPCS | Performed by: NURSE PRACTITIONER

## 2022-05-23 PROCEDURE — 3079F PR MOST RECENT DIASTOLIC BLOOD PRESSURE 80-89 MM HG: ICD-10-PCS | Mod: CPTII,S$GLB,, | Performed by: NURSE PRACTITIONER

## 2022-05-23 PROCEDURE — 1159F MED LIST DOCD IN RCRD: CPT | Mod: CPTII,S$GLB,, | Performed by: NURSE PRACTITIONER

## 2022-05-23 PROCEDURE — 3008F BODY MASS INDEX DOCD: CPT | Mod: CPTII,S$GLB,, | Performed by: NURSE PRACTITIONER

## 2022-05-23 PROCEDURE — 77049 MRI BREAST C-+ W/CAD BI: CPT | Mod: 26,,, | Performed by: RADIOLOGY

## 2022-05-23 PROCEDURE — 77049 MRI BREAST W/WO CONTRAST, W/CAD, BILATERAL: ICD-10-PCS | Mod: 26,,, | Performed by: RADIOLOGY

## 2022-05-23 PROCEDURE — 99999 PR PBB SHADOW E&M-EST. PATIENT-LVL V: CPT | Mod: PBBFAC,,, | Performed by: NURSE PRACTITIONER

## 2022-05-23 RX ADMIN — GADOBENATE DIMEGLUMINE 14 ML: 529 INJECTION, SOLUTION INTRAVENOUS at 05:05

## 2022-05-23 NOTE — PROGRESS NOTES
Reason For Consultation:   Increased lifetime risk of breast cancer    Referring Provider:   Aaareferral Self  No address on file    Records Obtained: Records of the patients history including those obtained from the referring provider were reviewed and summarized in detail.    HPI:   Natalia Lee is a 41 y.o. who presents for consultation of increased risk of breast cancer.      Today, Feels good and no complaints.   No breast concerns.    2021 MMG:   Impression:   No mammographic evidence of malignancy.     BI-RADS Category 1: Negative     Recommendation:  Routine screening mammogram in 1 year is recommended.     Your estimated lifetime risk of breast cancer (to age 85) based on Tyrer-Cuzick risk assessment model is Tyrer-Cuzick: 28.97 %. According to the American Cancer Society, patients with a lifetime breast cancer risk of 20% or higher might benefit from supplemental screening tests.    High Risk Breast cancer specific history:  - Age: 41 y.o.   - Height:  5'8  - Weight: 142 lbs  - Breast density per BI-RADS:  d - Extremely dense  - Age at menarche:  13 yo  - Number of pregnancies: G0  - She is perimenopausal.   -Uterus and ovaries intact: Yes  - HRT: No but is on progesterone only for periods  - Genetic testing: No  - Personal history of cancer: Melanoma 2016   - Previous chest radiation exposure between ages 10-30 years old: No  - Personal history of breast biopsy: No  - Ashkenazi Caoda[employer] Inheritance: No  - Family history of cancer:    Paternal aunt- breast cancer  59 yo  Paternal aunt- breast cancer  35 yo  Maternal grandfather- colon cancer  Paternal grandmother stomach cancer    Social History:  Tobacco use:  no  Alcohol use:  social  Exercise regimen: everyday- moving stairs and lifts weights  Employment: no    SEE CALCULATED RISK BELOW.     Past Medical   Past Medical History:   Diagnosis Date    Abnormal Pap smear     Abnormal Pap smear of cervix     ADHD (attention  deficit hyperactivity disorder)     Depression     Deviated septum     Fibroids     Herpes simplex without mention of complication     oral as per pt    HPV (human papilloma virus) anogenital infection     Insomnia     Mental disorder      Patient Active Problem List   Diagnosis    Burning sensation of vulva    Pruritus of vulva    Possible Contact dermatitis    At high risk for breast cancer    Left hip pain    Pelvic floor tension    Stress incontinence    Nocturia more than twice per night    Hip joint pain     Social History   Social History     Tobacco Use    Smoking status: Never Smoker    Smokeless tobacco: Never Used   Substance Use Topics    Alcohol use: Yes    Drug use: No     Family History  Family History   Problem Relation Age of Onset    Cancer Mother     Cancer Paternal Aunt     Lymphoma Paternal Aunt     Breast cancer Paternal Aunt     Cancer Cousin         ?    Breast cancer Paternal Aunt 40    Cancer Paternal Aunt     Cancer Paternal Grandfather         ?    Cancer Paternal Grandmother         ?    Colon cancer Maternal Grandfather     Cancer Maternal Grandfather     Thyroid cancer Maternal Aunt     Cancer Maternal Aunt      Medications    Current Outpatient Medications:     atovaquone-proguaniL (MALARONE) 250-100 mg Tab, Take one tablet daily for malaria prevention. Begin one day before entering malarious area and continue for 1 week after return., Disp: 40 tablet, Rfl: 0    dapsone (ACZONE) 5 % topical gel, Apply topically 2 (two) times daily., Disp: , Rfl:     eszopiclone (LUNESTA) 3 mg Tab, TAKE TWO TABLET BY MOUTH AT BEDTIME AS NEEDED FOR sleep, Disp: , Rfl:     ivermectin (SOOLANTRA) 1 % Crea, Apply topically., Disp: , Rfl:     clindamycin (CLEOCIN T) 1 % Swab, Apply 1 each topically 2 (two) times daily., Disp: , Rfl:     dextroamphetamine-amphetamine 30 mg Tab, Take 1 tablet by mouth 2 (two) times daily., Disp: , Rfl: 0    nystatin (MYCOSTATIN)  "ointment, Apply pea-sized amount to affected areas twice daily (mix with pea-sized amount of Triamcinolone ointment before applying)., Disp: 30 g, Rfl: 0    RETIN-A MICRO PUMP 0.06 % GlwP, , Disp: , Rfl:     TAZORAC 0.05 % Crea cream, 1 application every evening., Disp: , Rfl:     triamcinolone acetonide 0.1% (KENALOG) 0.1 % ointment, Apply pea-sized amount to affected areas twice daily (mix with pea-sized amount of Nystatin ointment before applying)., Disp: 30 g, Rfl: 0    valACYclovir (VALTREX) 500 MG tablet, Take 1 tablet (500 mg total) by mouth 2 (two) times daily for 5-7 days as needed, Disp: , Rfl:   Allergies  Review of patient's allergies indicates:   Allergen Reactions    Sulfa (sulfonamide antibiotics) Rash       Review of Systems       See above   All other systems reviewed and are negative.    Objective:      Vitals:   Vitals:    05/23/22 1351   BP: 126/85   Pulse: 66   Resp: 18   Temp: 97.8 °F (36.6 °C)   TempSrc: Oral   SpO2: 97%   Weight: 64.7 kg (142 lb 8.4 oz)   Height: 5' 8" (1.727 m)     BMI: Body mass index is 21.67 kg/m².   Body surface area is 1.76 meters squared.    Physical Exam  Vitals signs reviewed.   Constitutional:  Normal appearance. NAD.   HENT: Normocephalic.   Eyes: Pupils are equal, round, and reactive to light.   Cardiovascular: Normal rate.   Pulmonary: Pulmonary effort is normal.   Musculoskeletal: Normal range of motion.   Lymphadenopathy: No cervical adenopathy. No axillary adenopathy.   Skin: Skin is warm and dry.   Neurological:  She is alert and oriented to person, place, and time.   Psychiatric: Mood normal.     Breast Exam: +dense breasts; small adenopathy palapted on right axilla line      Laboratory Data: reviewed most recent   Imaging: reviewed most recent        Assessment:     1. At high risk for breast cancer    2. Dense breast tissue on mammogram    3. Family history of breast cancer        1. Increased risk of breast cancer   * Tyrer-Cuzick (TC) lifetime risk " of 27.6%. We discussed that TC score will categorize your lifetime risk of being diagnosed with breast cancer. Categories are as such: Average risk <15%, Intermediate risk 15-19%, and High risk > or = to 20%.    *The Danielle Model for Breast Cancer risk: She has a 0.7% 5-year breast cancer risk (Compared with 0.7% for the average 42 y.o. woman) and 10.9% Lifetime breast cancer risk (Compared with 22.2% for the average 42 y.o. woman). A woman's risk is considered low if her five-year risk of developing breast cancer is less than 1.6%; it is considered high if she scores above 1.66%. (All women who are over 60 have a score of at least 1.66 and are considered high risk, based on the Danielle Model.)    Educational videos:   What Is a TC Score?  https://youtu.be/Elot4UYExdK     What If I Have a High-Risk TC Score?   https://youScore The Boardu.be/lYe5gRv5y9G      Discussed with patient that there are several models available for stratifying breast cancer risk, and Ananya-Kevinck is presently the model utilized by [facility] and is a model recommended per current NCCN guidelines.    The Danielle Model for Breast Cancer risk estimates the absolute 5 year risk and lifetime risk of developing breast cancer. Family history includes only first degree relatives with breast cancer, which is not enough information to estimate the risk of a patient having BRCA mutation. It also underestimates the cancer risk for patients with extensive family history. The Danielle Model is a good predictor of risk for populations but not for individuals. It adjusts risk for race/ethnicity. It may underestimate breast cancer risk in patients with atypical hyperplasia and strong family history. The Danielle Model was NOT designed to estimate risk for: Women with a prior diagnosis of breast cancer, lobular carcinoma in situ (LCIS), or ductal carcinoma in situ (DCIS);  Women who have received previous radiation therapy to the chest for treatment of Hodgkin lymphoma;  Women  with gene mutations in BRCA1 or BRCA2, or those who are known to have certain genetic syndromes that increase risk for breast cancer; Women of age <35 or >85.    We discussed that there are limitations to every model for risk assessment, particularly that TC can overestimate risk in women with atypical hyperplasia and dense breasts and that Danielle underestimates risk for those with a strong family history of breast or ovarian cancers as well as non-white women with atypical hyperplasia which can make them appear to not be candidates for risk reducing therapies.              Recommendation for women with elevated TC score:         Risk factors are categorized into 2 groups: Modifiable and Non-modifiable. Modifiable risk factors include use of hormones, alcohol, smoking, diet and exercise. Non-modifiable risk factors include breast density, genetics, chest radiation, previous pregnancies, age of first period, and age of menopause.     Factors associated with greater breast cancer risk:  -Increasing age The risk of breast cancer increases with older age.  -Female sex  -White race (In the [place], the highest breast cancer risk occurs among White women, although breast cancer remains the most common cancer among women of every major ethnic/racial group )  -Weight and body fat in postmenopausal women -Obesity (defined as body mass index [BMI] ?30 kg/m2) is associated with an overall increase  in morbidity and mortality. However, the risk of breast cancer associated with BMI differs by menopausal status.   ?Postmenopausal women - A higher BMI and/or perimenopausal weight gain have been consistently associated with a higher risk of breast cancer among postmenopausal women. The association between a higher BMI and postmenopausal breast cancer risk may be mediated by higher estrogen levels resulting from the peripheral conversion of estrogen precursors (from adipose tissue) to estrogen    ?Inverse relationship in  premenopausal women - Unlike postmenopausal women, an increased BMI is associated with a lower risk of breast cancer in premenopausal women, particularly in early adulthood.  The explanation of this finding remains unclear.  -Tall stature -women who were >175 cm (69 inches) tall were 20 percent more likely to develop breast cancer than those <160 cm (63 inches) tall.  -Benign breast disease  -Dense breast tissue -- The density of breast tissue reflects the relative amount of glandular and connective tissue (parenchyma) to adipose tissue. Women with mammographically dense breast tissue, generally defined as dense tissue comprising ?75 percent of the breast, have a four to five times higher breast cancer risk compared with women of similar age with less or no dense tissue. Although breast density is a largely inherited trait, other factors can influence density. For example, lower density has been associated with higher levels of physical activity  and with a low-fat, high-carbohydrate diet. In postmenopausal women, estrogen and progesterone increase breast density  while the ER antagonist tamoxifen decreases breast density.  Despite the association of exogenous hormones with breast density, breast density is not strongly correlated with endogenous hormone levels. Breast tend to become more fatty with age.   -Bone mineral density -In multiple studies, women with higher bone density have a higher breast cancer risk  -Hormonal factors: HRT. Of note, IVF does not appear to increase the long-term risk of breast cancer, even in women with BRCA 1 and 2 mutations.     In the Women's Health Initiative (WHI), the risk of invasive breast cancer was significantly increased with combined hormone therapy (HT) at an average follow-up of 5.6 years.     A 2019 meta-analysis of all available epidemiologic evidence on the association between menopausal hormone therapy (MHT) use and breast cancer risk has been published. The analysis  included nearly 145,000 women with breast cancer (51 percent of whom had used MHT) and nearly 425,000 without breast cancer. Their findings included:  ?Similar to the WHI, estrogen-progestin regimens were associated with excess breast cancer risk. An excess risk was also seen with estrogen-only regimens (a reduction in risk was seen in the WHI). There was no excess risk with vaginal estrogens.   ?Breast cancer risk increased with the duration of systemic MHT use. Unlike previous studies, obesity was not associated with excess risk; instead, it attenuated risk.  ?The authors of the study calculated that for women of average weight, five years of MHT use starting at age 50 years would increase their 20-year risk of breast cancer (between the ages of 50 and 69 years) by approximately:  One in every 50 users of estrogen plus daily progestin  One in every 70 users of estrogen plus intermittent progestin   One in every 200 users of estrogen-only regimens   Of note: There were important limitations in this study.   While this meta-analysis has renewed concerns for some about the association between MHT and breast cancer, we continue to suggest an individualized approach when counseling symptomatic postmenopausal women about treatment. This includes putting the potential risk of breast cancer (and cardiovascular disease) in the context of the benefits of MHT (eg, relief of vasomotor symptoms, improved sleep and quality of life, and prevention of bone loss)  -Reproductive factors -Earlier menarche or later menopause, Nulliparity, Increasing age at first full-term pregnancy.   (Of note, It is estimated that for every 12 months of breastfeeding, there was a 4.3 percent reduction in the relative risk (RR) of breast cancer)  -Personal and family history of breast cancer  -Alcohol use and smoking  -Exposure to therapeutic ionizing radiation          LIFESTYLE MODIFICATIONS:    * Reviewed Lifestyle modifications which have  shown benefit:  · Limit alcohol consumption to less than 1 drink per day (1 ounce liquor, 6 oz wine, 8 oz beer)  · Avoid smoking.  · Exercise at least 150 minutes per week of moderate intensity aerobic activity or at least 75 minutes of vigorous activity. Exercise can lower the relative risk of breast cancer by ~18-20%.  · Maintain healthy weight and avoid post-menopausal weight gain. Avoid processed foods and eat more lean proteins, fruits and vegetables.                               * Discussed available resources including genetic counseling, nutrition, weight management.         SCREENING:                * Reviewed future screening:   · Semiannual (every 6 months) CBE (clinical breast exam).   · Annual Breast MRI alternating with an annual MMG.     The use of MRI for breast cancer detection is based on the concept of  tumor angiogenesis or neovascularity. Tumor-associated blood vessels have increased permeability, which leads to prompt uptake and release of gadolinium within the first one to two minutes after administration, leading to a pattern of rapid enhancement and washout on MRI.   Bilateral breast examination - Both breasts should be evaluated in an MRI study, for comparison purposes, even when concern about possible pathology involves only one breast.  Contrast - Intravenous gadolinium contrast must be used to maximize cancer detection and is administered before breast MRI to highlight the neovascularity associated with cancers. Contrast is not necessary when the study is performed to evaluate silicone implant integrity.  Allergic and anaphylactoid reactions to gadolinium are rare, but can occur. In addition, in patients with renal failure, gadolinium can cause contrast nephropathy and/or nephrogenic systemic fibrosis.   A few studies have also reported gadolinium deposition in the brain from repeated intravenous administration, with the degree of deposition varying based on the specific contrast agent.  The clinical significance of this deposition remains unknown, and no data for humans exist to show any adverse effects or harm at this time.   She understands that she may contact her insurance company with regards to coverage of MRI breasts.   She can not undergo an MRI if pregnant.   We discussed that MRI's may have false positives                    Plan:       1. Patient elects to proceed with alternating annual mammogram and annual breast MRI along with semiannual CBEs.  2. 2 CBE annually- one per GYN an one here.  3. Life style modifications as above  4.   Encouraged breast awareness, including monthly breast self-exams.   5.   Referral to cancer genetics.    MRI breast today- scheduled and then yearly in May  RTC in 11/2022 to see me either at [facility] or on 3rd floor with a MMG same day. I will see annually    Route Chart for Scheduling    Med Onc Chart Routing      Follow up with physician    Follow up with DWAINE . RTC in 11/2022 to see me either at [facility] or on 3rd floor with a MMG same day   Labs    Imaging    MMG 11/2022 - do at [facility]. same day i see her   Pharmacy appointment    Other referrals             Questions were encouraged and answered to patient's satisfaction, and patient verbalized understanding of information and agreement with the plan. Advised patient to RTC with any interval changes or concerns.      Patient is in agreement with the proposed treatment plan. All questions were answered to the patient's satisfaction. Pt knows to call clinic for any new or worsening symptoms and if anything is needed before the next clinic visit.      JIMBO TrevinoP-C  Hematology & Oncology  [facility] [place] [address]  ph. [phone]  Fax. [phone]     Face to Face time with patient: 45 minutes  60 minutes of total time spent on the encounter, which includes face to face time and non-face to face time preparing to see the patient (eg, review of tests), Obtaining and/or  reviewing separately obtained history, Documenting clinical information in the electronic or other health record, Independently interpreting results (not separately reported) and communicating results to the patient/family/caregiver, or Care coordination (not separately reported). \

## 2022-05-23 NOTE — PATIENT INSTRUCTIONS
1. Increased risk of breast cancer   * Ananya-Kevinck (TC) lifetime risk of 27.6%. We discussed that TC score will categorize your lifetime risk of being diagnosed with breast cancer. Categories are as such: Average risk <15%, Intermediate risk 15-19%, and High risk > or = to 20%.    *The Danielle Model for Breast Cancer risk: She has a 0.7% 5-year breast cancer risk (Compared with 0.7% for the average 42 y.o. woman) and 10.9% Lifetime breast cancer risk (Compared with 22.2% for the average 42 y.o. woman). A woman's risk is considered low if her five-year risk of developing breast cancer is less than 1.6%; it is considered high if she scores above 1.66%. (All women who are over 60 have a score of at least 1.66 and are considered high risk, based on the Danielle Model.)    Educational videos:   What Is a TC Score?  https://youWebchutneyu.be/Fwjq2ICXgsU     What If I Have a High-Risk TC Score?   https://[employer]be/rPv8eHb0g1Q      Discussed with patient that there are several models available for stratifying breast cancer risk, and Tyrer-Cuzick is presently the model utilized by [facility] and is a model recommended per current NCCN guidelines.    The Danielle Model for Breast Cancer risk estimates the absolute 5 year risk and lifetime risk of developing breast cancer. Family history includes only first degree relatives with breast cancer, which is not enough information to estimate the risk of a patient having BRCA mutation. It also underestimates the cancer risk for patients with extensive family history. The Danielle Model is a good predictor of risk for populations but not for individuals. It adjusts risk for race/ethnicity. It may underestimate breast cancer risk in patients with atypical hyperplasia and strong family history. The Danielle Model was NOT designed to estimate risk for: Women with a prior diagnosis of breast cancer, lobular carcinoma in situ (LCIS), or ductal carcinoma in situ (DCIS);  Women who have received previous  radiation therapy to the chest for treatment of Hodgkin lymphoma;  Women with gene mutations in BRCA1 or BRCA2, or those who are known to have certain genetic syndromes that increase risk for breast cancer; Women of age <35 or >85.    We discussed that there are limitations to every model for risk assessment, particularly that TC can overestimate risk in women with atypical hyperplasia and dense breasts and that Danielle underestimates risk for those with a strong family history of breast or ovarian cancers as well as non-white women with atypical hyperplasia which can make them appear to not be candidates for risk reducing therapies.             Recommendation for women with elevated TC score:         Risk factors are categorized into 2 groups: Modifiable and Non-modifiable. Modifiable risk factors include use of hormones, alcohol, smoking, diet and exercise. Non-modifiable risk factors include breast density, genetics, chest radiation, previous pregnancies, age of first period, and age of menopause.     Factors associated with greater breast cancer risk:  -Increasing age The risk of breast cancer increases with older age.  -Female sex  -White race (In the [place], the highest breast cancer risk occurs among White women, although breast cancer remains the most common cancer among women of every major ethnic/racial group )  -Weight and body fat in postmenopausal women -Obesity (defined as body mass index [BMI] ?30 kg/m2) is associated with an overall increase  in morbidity and mortality. However, the risk of breast cancer associated with BMI differs by menopausal status.   ?Postmenopausal women - A higher BMI and/or perimenopausal weight gain have been consistently associated with a higher risk of breast cancer among postmenopausal women. The association between a higher BMI and postmenopausal breast cancer risk may be mediated by higher estrogen levels resulting from the peripheral conversion of estrogen  precursors (from adipose tissue) to estrogen    ?Inverse relationship in premenopausal women - Unlike postmenopausal women, an increased BMI is associated with a lower risk of breast cancer in premenopausal women, particularly in early adulthood.  The explanation of this finding remains unclear.  -Tall stature -women who were >175 cm (69 inches) tall were 20 percent more likely to develop breast cancer than those <160 cm (63 inches) tall.  -Benign breast disease  -Dense breast tissue -- The density of breast tissue reflects the relative amount of glandular and connective tissue (parenchyma) to adipose tissue. Women with mammographically dense breast tissue, generally defined as dense tissue comprising ?75 percent of the breast, have a four to five times higher breast cancer risk compared with women of similar age with less or no dense tissue. Although breast density is a largely inherited trait, other factors can influence density. For example, lower density has been associated with higher levels of physical activity  and with a low-fat, high-carbohydrate diet. In postmenopausal women, estrogen and progesterone increase breast density  while the ER antagonist tamoxifen decreases breast density.  Despite the association of exogenous hormones with breast density, breast density is not strongly correlated with endogenous hormone levels. Breast tend to become more fatty with age.   -Bone mineral density -In multiple studies, women with higher bone density have a higher breast cancer risk  -Hormonal factors: HRT. Of note, IVF does not appear to increase the long-term risk of breast cancer, even in women with BRCA 1 and 2 mutations.     In the Women's Health Initiative (WHI), the risk of invasive breast cancer was significantly increased with combined hormone therapy (HT) at an average follow-up of 5.6 years.     A 2019 meta-analysis of all available epidemiologic evidence on the association between menopausal hormone  therapy (MHT) use and breast cancer risk has been published. The analysis included nearly 145,000 women with breast cancer (51 percent of whom had used MHT) and nearly 425,000 without breast cancer. Their findings included:  ?Similar to the WHI, estrogen-progestin regimens were associated with excess breast cancer risk. An excess risk was also seen with estrogen-only regimens (a reduction in risk was seen in the WHI). There was no excess risk with vaginal estrogens.   ?Breast cancer risk increased with the duration of systemic MHT use. Unlike previous studies, obesity was not associated with excess risk; instead, it attenuated risk.  ?The authors of the study calculated that for women of average weight, five years of MHT use starting at age 50 years would increase their 20-year risk of breast cancer (between the ages of 50 and 69 years) by approximately:  One in every 50 users of estrogen plus daily progestin  One in every 70 users of estrogen plus intermittent progestin   One in every 200 users of estrogen-only regimens   Of note: There were important limitations in this study.   While this meta-analysis has renewed concerns for some about the association between MHT and breast cancer, we continue to suggest an individualized approach when counseling symptomatic postmenopausal women about treatment. This includes putting the potential risk of breast cancer (and cardiovascular disease) in the context of the benefits of MHT (eg, relief of vasomotor symptoms, improved sleep and quality of life, and prevention of bone loss)  -Reproductive factors -Earlier menarche or later menopause, Nulliparity, Increasing age at first full-term pregnancy.   (Of note, It is estimated that for every 12 months of breastfeeding, there was a 4.3 percent reduction in the relative risk (RR) of breast cancer)  -Personal and family history of breast cancer  -Alcohol use and smoking  -Exposure to therapeutic ionizing radiation           LIFESTYLE MODIFICATIONS:    * Reviewed Lifestyle modifications which have shown benefit:  Limit alcohol consumption to less than 1 drink per day (1 ounce liquor, 6 oz wine, 8 oz beer)  Avoid smoking.  Exercise at least 150 minutes per week of moderate intensity aerobic activity or at least 75 minutes of vigorous activity. Exercise can lower the relative risk of breast cancer by ~18-20%.  Maintain healthy weight and avoid post-menopausal weight gain. Avoid processed foods and eat more lean proteins, fruits and vegetables.                               * Discussed available resources including genetic counseling, nutrition, weight management.         SCREENING:                * Reviewed future screening:   Semiannual (every 6 months) CBE (clinical breast exam).   Annual Breast MRI alternating with an annual MMG.     The use of MRI for breast cancer detection is based on the concept of  tumor angiogenesis or neovascularity. Tumor-associated blood vessels have increased permeability, which leads to prompt uptake and release of gadolinium within the first one to two minutes after administration, leading to a pattern of rapid enhancement and washout on MRI.   Bilateral breast examination - Both breasts should be evaluated in an MRI study, for comparison purposes, even when concern about possible pathology involves only one breast.  Contrast - Intravenous gadolinium contrast must be used to maximize cancer detection and is administered before breast MRI to highlight the neovascularity associated with cancers. Contrast is not necessary when the study is performed to evaluate silicone implant integrity.  Allergic and anaphylactoid reactions to gadolinium are rare, but can occur. In addition, in patients with renal failure, gadolinium can cause contrast nephropathy and/or nephrogenic systemic fibrosis.   A few studies have also reported gadolinium deposition in the brain from repeated intravenous administration, with  the degree of deposition varying based on the specific contrast agent. The clinical significance of this deposition remains unknown, and no data for humans exist to show any adverse effects or harm at this time.   She understands that she may contact her insurance company with regards to coverage of MRI breasts.   She can not undergo an MRI if pregnant.   We discussed that MRI's may have false positives                    Plan:       Patient elects to proceed with alternating annual mammogram and annual breast MRI along with semiannual CBEs.  2 CBE annually- one per GYN an one here.  Life style modifications as above  4.   Encouraged breast awareness, including monthly breast self-exams.   5.   Referral to cancer genetics.    MRI breast today- scheduled and then yearly in May  RTC in 11/2022 to see me either at [facility] or on 3rd floor with a MMG same day. I will see annually

## 2022-05-24 ENCOUNTER — TELEPHONE (OUTPATIENT)
Dept: HEMATOLOGY/ONCOLOGY | Facility: CLINIC | Age: 42
End: 2022-05-24
Payer: COMMERCIAL

## 2022-05-30 ENCOUNTER — PATIENT MESSAGE (OUTPATIENT)
Dept: OBSTETRICS AND GYNECOLOGY | Facility: CLINIC | Age: 42
End: 2022-05-30
Payer: COMMERCIAL

## 2022-05-31 RX ORDER — FLUCONAZOLE 150 MG/1
150 TABLET ORAL DAILY
Qty: 5 TABLET | Refills: 0 | Status: SHIPPED | OUTPATIENT
Start: 2022-05-31 | End: 2022-06-01

## 2022-06-21 ENCOUNTER — TELEPHONE (OUTPATIENT)
Dept: HEMATOLOGY/ONCOLOGY | Facility: CLINIC | Age: 42
End: 2022-06-21
Payer: COMMERCIAL

## 2022-07-27 ENCOUNTER — TELEPHONE (OUTPATIENT)
Dept: HEMATOLOGY/ONCOLOGY | Facility: CLINIC | Age: 42
End: 2022-07-27
Payer: COMMERCIAL

## 2022-08-18 ENCOUNTER — PATIENT MESSAGE (OUTPATIENT)
Dept: HEMATOLOGY/ONCOLOGY | Facility: CLINIC | Age: 42
End: 2022-08-18

## 2022-08-18 ENCOUNTER — OFFICE VISIT (OUTPATIENT)
Dept: HEMATOLOGY/ONCOLOGY | Facility: CLINIC | Age: 42
End: 2022-08-18
Payer: COMMERCIAL

## 2022-08-18 ENCOUNTER — PATIENT MESSAGE (OUTPATIENT)
Dept: HEMATOLOGY/ONCOLOGY | Facility: CLINIC | Age: 42
End: 2022-08-18
Payer: COMMERCIAL

## 2022-08-18 DIAGNOSIS — Z80.3 FAMILY HISTORY OF BREAST CANCER: ICD-10-CM

## 2022-08-18 DIAGNOSIS — Z80.8 FAMILY HISTORY OF THYROID CANCER: ICD-10-CM

## 2022-08-18 DIAGNOSIS — Z71.83 ENCOUNTER FOR NONPROCREATIVE GENETIC COUNSELING: Primary | ICD-10-CM

## 2022-08-18 DIAGNOSIS — Z80.0 FAMILY HISTORY OF COLON CANCER: ICD-10-CM

## 2022-08-18 DIAGNOSIS — Z85.820 HISTORY OF MELANOMA: ICD-10-CM

## 2022-08-18 PROCEDURE — 99999 PR PBB SHADOW E&M-EST. PATIENT-LVL III: CPT | Mod: PBBFAC,,, | Performed by: NURSE PRACTITIONER

## 2022-08-18 PROCEDURE — 1159F MED LIST DOCD IN RCRD: CPT | Mod: CPTII,S$GLB,, | Performed by: NURSE PRACTITIONER

## 2022-08-18 PROCEDURE — 99215 OFFICE O/P EST HI 40 MIN: CPT | Mod: S$GLB,,, | Performed by: NURSE PRACTITIONER

## 2022-08-18 PROCEDURE — 99215 PR OFFICE/OUTPT VISIT, EST, LEVL V, 40-54 MIN: ICD-10-PCS | Mod: S$GLB,,, | Performed by: NURSE PRACTITIONER

## 2022-08-18 PROCEDURE — 99999 PR PBB SHADOW E&M-EST. PATIENT-LVL III: ICD-10-PCS | Mod: PBBFAC,,, | Performed by: NURSE PRACTITIONER

## 2022-08-18 PROCEDURE — 1159F PR MEDICATION LIST DOCUMENTED IN MEDICAL RECORD: ICD-10-PCS | Mod: CPTII,S$GLB,, | Performed by: NURSE PRACTITIONER

## 2022-08-18 NOTE — PROGRESS NOTES
"Cancer Genetics  Hereditary and High-Risk Clinic  Department of Hematology and Oncology  [facility]    [facility]    Date of Service:  22  Visit Provider:  Justin Dill DNP  Collaborating Physician:  Ela Mak MD    Patient Identification  Name: Natalia Lee  : 1980  MRN: [id number]     Referring Provider    Siri Rowan NP  [facility] [place] [address]    SUBJECTIVE      Chief Complaint: Genetic Evaluation    History of Present Illness (HPI):  Natalia Lee ("Natalia"), 41 y.o., assigned female sex at birth, is established with the [facility] Department of H[facility]ology [facility] but new to me.  She was referred by Siri Rowan NP, with the [facility], for cancer genetic risk assessment given her family cancer history.    Focused Medical History   Germline genetic testing:  No   Cancer:  Yes  o Melanoma of the back x1-2 and of leg x2  - Treatment:   Surgeries only  - Patient-endorsed risk factors:   Loved the sun   Used tanning beds in high school  - Current dermatologist:  Dr. Mani Padilla   Colon polyp:  N/A - No history of colonoscopy   Other benign tumor/mass:  Yes  o Lipoma of arm, s/p excision  o Adnexal mass -- Ovarian cysts(?), -2021   o Uterine fibroids (patient believes)   Pancreatitis:  No    Focused Surgical History  · Reproductive organs intact    Family Oncologic History  ** The pedigree below was placed into this note in a size that produced a legible font.  If it is appearing small/illegible on your screen, expand this note window horizontally. **     Hereditary cancer genetic testing:  None known   Ashkenazi Yarsanism inheritance:  Maybe mom's side Yarsanism ancestry, but no idea if Ashkenazi Yarsanism   Consanguinity in ancestors:  No   No biological children   Other than noted, no known history of cancer in relatives depicted in the pedigree or in:  maternal extended family, maternal extended " family.   Other than noted, no known family history of colon polyps, lipomas, or other benign tumors.    Review of Systems   See HPI.     Patient's Distress Score today was 5 (not 7)/10 (with 10 being the worst).  Patient attributes this to life in general, the usual - Not in any danger.  Patient denies experiencing suicidal or homicidal ideations (SI/HI).  Offered patient a referral to Behavioral Health, and patient declined, citing already been established with such.      OBJECTIVE      Past Medical History:   Diagnosis Date    Abnormal Pap smear     Abnormal Pap smear of cervix     ADHD (attention deficit hyperactivity disorder)     Depression     Deviated septum     Fibroids     Herpes simplex without mention of complication     oral as per pt    HPV (human papilloma virus) anogenital infection     Insomnia     Mental disorder      Patient Active Problem List    Diagnosis Date Noted    Pelvic floor tension 03/25/2021    Stress incontinence 03/25/2021    Nocturia more than twice per night 03/25/2021    Hip joint pain 03/25/2021    Left hip pain 02/03/2021    At high risk for breast cancer 03/13/2019    Burning sensation of vulva 11/10/2016    Pruritus of vulva 11/10/2016    Possible Contact dermatitis 11/10/2016      Physical Exam  Very pleasant patient.  Unaccompanied.  Vitals signs:  Reviewed:  Vitals:    08/18/22 1445   PainSc: 0-No pain   Constitutional      Appearance:  She appears well developed and well nourished. No distress.   Pulmonary     Effort:  Pulmonary effort is normal.   Neurological     Mental Status:  She is alert and oriented.     Coordination:  Coordination is normal.   Psychiatric         Mood and Affect:  She has a normal mood and affect.     Thought Content:  Thought content is normal.         Speech:  Speech is normal.     Behavior:  Behavior is normal.     Judgment:  Judgment is normal.   Genetics-specific     It is my assessment that the patient is ready to proceed  with cancer genetic testing from a psychosocial perspective.    COUNSELING      Cancer Genetics     Germline cancer genetic testing is the testing of genes associated with cancer, known as cancer susceptibility genes.  Just as these genes are inherited from parents--one copy of each gene from each parent--mutations in these genes can be inherited, as well.  A mutation in a cancer susceptibility gene adversely affects the gene's ability to prevent cancer; therefore, carriers of cancer susceptibility gene mutations may be at increased risk for certain cancers.     Causes of Cancer    Only a small percentage of cancers are caused by an inherited cancer susceptibility gene mutation; rather, most cancers are sporadic.  Causes of sporadic cancers may include environmental risk factors, lifestyle risk factors, and non-modifiable risk factors.  It is important to note that members of a family often share not only their genetics but also risk factors including environmental and lifestyle risk factors, so cancers can be familial.     Potential Results of Genetic Testing, and Their Implications     Potential results of genetic testing include positive, negative, and variant of unknown significance (VUS).    · A positive result indicates the presence of at least one clinically significant mutation, and the patient's associated cancer risks vary depending upon the cancer susceptibility gene(s) in which there is/are a mutation(s).  With a positive result, in some cases, depending upon the specific result and the patient's clinical history, modified risk management may be recommended, including measures for risk reduction and/or surveillance; however, modified management is not always an option.    · A negative result indicates that no clinically significant mutations were identified in the gene(s) tested.    · A VUS indicates that there is not presently enough data for the laboratory to make a determination as to whether the  variant is clinically significant.  VUSs are not typically acted upon clinically.      A negative result in the patient does not indicate that she cannot develop cancer.  In fact, she may even be at increased risk for cancer based on shared risk factors with affected relatives.      Genetic Mutation Inheritance     If the patient tests positive for a mutation, her first-degree relatives would each have a 50% chance of having the same mutation, and other, more distant blood relatives would also be at risk of having the same mutation.       Genetic Discrimination     The Genetic Information Nondiscrimination Act (PILO) is [place] federal legislation that provides some protections against use of an individual's genetic information by their health insurer and by their employer.      Title I of PILO prohibits most health insurers from utilizing an individual's genetic information to make decisions regarding insurance eligibility or premium charges.  PILO does not protect individuals from genetic discrimination toward health insurance obtained through a job with the  or through the Federal Employees Health Benefits Plan.    Title II of PILO prohibits covered entities, including employers, from requesting the genetic information of employees and applicants.  PILO does not protect individuals from genetic discrimination by employers with fewer than 15 employees or if employed by the .    PILO does not protect individuals from genetic discrimination by any other type of policies/entities, including but not limited to life insurance, disability insurance, long-term care insurance,  benefits, and  Health Services benefits.    Genetic Testing Logistics     An outside laboratory would perform the testing after a blood sample is collected here at [facility] or a saliva sample is collected by the patient at home.      There is a potential for the patient to incur out-of-pocket costs related to the genetic  testing.    Results can take several weeks - typically, about 3 weeks.    Post-test genetic counseling can be conducted once the genetic testing results are available.     Interventions:  From a clinical standpoint, hereditary cancer susceptibility gene testing is recommended for Natalia.  Offered Natalia options of proceeding with hereditary cancer susceptibility gene testing at this time versus delaying/declining such.  Natalia desires to proceed with germline cancer genetic testing at this time and has provided her informed consent to proceed.  Various test panel options were discussed; implications of DNA versus DNA+RNA were discussed.    ASSESSMENT/PLAN      Natalia, 41 y.o., presented today for cancer genetic risk assessment given her family cancer history.    1. Encounter for nonprocreative genetic counseling  - Cancer genetic risk assessment and pre-test cancer genetic counseling were conducted.  From a clinical standpoint, hereditary cancer susceptibility gene testing is recommended for Natalia.  Natalia desires to proceed with germline cancer genetic testing at this time and has provided her verbal and written informed consent to proceed.  The panel being pursued DNA-only:  the Inv[facility]ae Multi-Cancer Panel, as well as the MC1R gene and other genes as indicated based on personal/family history.  A saliva specimen will be collected by the patient after the kit arrives at her home.  Results are expected approximately 2-3 weeks after the genetic testing lab receives the specimen.      2. History of melanoma  - Genetic Misc Sendout Test, Non-Blood; Future    3. Family history of breast cancer  - Ambulatory referral/consult to Genetics - Completed  - Genetic Misc Sendout Test, Non-Blood; Future  - Established with [facility] (Siri Rowan NP)    4. Family history of colon cancer  - Genetic Misc Sendout Test, Non-Blood; Future    5. Family history of thyroid cancer  - Genetic Misc Sendout Test,  Non-Blood; Future     Natalia expressed interest in the Galleri test- I reached out to RAYA Aguilar RN, asking RN to reach out to patient.    Follow-up:  Follow up for post-test genetic counseling, if indicated or if patient desires.    Questions were encouraged and answered to the patient's satisfaction, and she verbalized understanding of information and agreement with the plan.           Approximately 49 minutes were spent face-to-face with the patient.  Approximately 68 minutes in total were spent on this encounter, which includes face-to-face time and non-face-to-face time preparing to see the patient (e.g., review of tests), obtaining and/or reviewing separately obtained history, documenting clinical information in the electronic or other health record, independently interpreting results (not separately reported) and communicating results to the patient/family/caregiver, or care coordination (not separately reported).         Justin Dill, DNP, APRN, FNP-BC, AOCNP  Nurse Practitioner, Hereditary and High-Risk Clinic  Department of Hematology and Oncology  [facility]

## 2022-08-18 NOTE — Clinical Note
Marilee Juarez,  Can you please contact pt about the Galleri test?  She expressed interest.  Thanks! Justin

## 2022-08-22 ENCOUNTER — PATIENT MESSAGE (OUTPATIENT)
Dept: OBSTETRICS AND GYNECOLOGY | Facility: CLINIC | Age: 42
End: 2022-08-22
Payer: COMMERCIAL

## 2022-08-22 RX ORDER — FLUCONAZOLE 150 MG/1
150 TABLET ORAL DAILY
Qty: 2 TABLET | Refills: 2 | Status: SHIPPED | OUTPATIENT
Start: 2022-08-22 | End: 2022-08-23

## 2022-09-08 ENCOUNTER — PATIENT MESSAGE (OUTPATIENT)
Dept: HEMATOLOGY/ONCOLOGY | Facility: CLINIC | Age: 42
End: 2022-09-08
Payer: COMMERCIAL

## 2022-09-08 ENCOUNTER — TELEPHONE (OUTPATIENT)
Dept: HEMATOLOGY/ONCOLOGY | Facility: CLINIC | Age: 42
End: 2022-09-08
Payer: COMMERCIAL

## 2022-09-08 DIAGNOSIS — Z15.89 GENE MUTATION: Primary | ICD-10-CM

## 2022-09-08 NOTE — TELEPHONE ENCOUNTER
Phoned pt to discuss positive germline cancer genetic test results, which it appears she has already viewed.  She did not answer the phone.  I left her a VM letting her know that I would be messaging her via [facility] with what I was calling about and asking her to respond there or call me back, and I left her my direct contact info.    Justin Dill NP  09/08/2022         CC:  Siri Rowan NP  [address] [place] [address]

## 2022-09-08 NOTE — TELEPHONE ENCOUNTER
Pt returned my call.  Discussed implications of RAD50 mutation.  Pt will see me for post-test genetic counseling on Monday, 09/19/22, @ 2:00 PM; this will be in person unless Hazel cannot be done same day, in which case will switch to virtual.  Will reach out to RAYA Aguilar RN, regarding getting Galleri test set up per pt's request.

## 2022-09-09 ENCOUNTER — PATIENT MESSAGE (OUTPATIENT)
Dept: HEMATOLOGY/ONCOLOGY | Facility: CLINIC | Age: 42
End: 2022-09-09
Payer: COMMERCIAL

## 2022-09-09 ENCOUNTER — TELEPHONE (OUTPATIENT)
Dept: HEMATOLOGY/ONCOLOGY | Facility: CLINIC | Age: 42
End: 2022-09-09
Payer: COMMERCIAL

## 2022-09-09 NOTE — TELEPHONE ENCOUNTER
Nurse called Ms. Lee to review Grail Galleri test. Pt requested that I send all info to her [employer] to review. Nurse encouraged patient to read all educational info that will be sent about the test.  She is requesting scheduling on 9/19 before her appt with Justin Dill DNP.  Please see [facility] message for all details regarding the Grail Galleri test.

## 2022-09-16 NOTE — PROGRESS NOTES
"Cancer Genetics  Hereditary and High-Risk Clinic  Department of Hematology and Oncology  [facility]    Date of Service:  22  Visit Provider:  Justin Dill DNP  Collaborating Physician:  Ela Mak MD    Patient ID  Name: Natalia Lee    : 1980    MRN: [id number]      SUBJECTIVE      Chief Complaint: Follow-up (Genetic test results )    History of Present Illness (HPI):  Natalia Lee ("Natalia"), 41 y.o., assigned female sex at birth, is an established patient of mine who initially presented for cancer genetic risk assessment on 2022 after having been referred by Siri Rowan NP, with the [facility], given the family cancer history.  Natalia returns today for post-test cancer genetic counseling given her germline cancer genetic testing revealed she carries a heterozygous RAD50 gene mutation.    Focused Medical History  Germline cancer genetic testing:  Yes, once:        Genes analyzed (DNA):  ADA, AIP, ALK, APC, BETTY, AXIN2, BAP1, BARD1, BLM, BMPR1A, BRCA1, BRCA2, BRIP1, CARD11, CARMIL2, CASP8, CASR, CD27, CDC73, CDH1, CDK4, CDKN1B, CDKN1C, CDKN2A (p14ARF), CDKN2A (o09KIJ8h), CEBPA, CHEK2, CTLA4, CTNNA1, CTPS1, DICER1, DIS3L2, DOCK8, EGFR, EPCAM, FADD, FAS, FASLG, FCHO1, FH, FLCN, GALNT12, GATA2, GPC3, GREM1, HOXB13, HRAS, IKZF1, IL2RA, IL2RB, ITK, KIT, MAGT1, MAX, MC1R, MCM4, MEN1, MET, MITF, MLH1, MLH3, MSH2, MSH3, MSH6, MUTYH, NBN, NF1, NF2, NTHL1, PALB2, PDGFRA, PHOX2B, PIK3CD, PIK3R1, PMS2, POLD1, POLE, POT1, CFHXY6D, PRKCD, PTCH1, PTEN, RAC2, RAD50, RAD51C, RAD51D, RASGRP1, RB1, RECQL4, RET, RHOH, RMRP, RNF43, RPS20, RUNX1, SDHA, SDHAF2, SDHB, SDHC, SDHD, SH2D1A, SMAD4, SMARCA4, SMARCB1, SMARCE1, STAT3, STK11, STK4, STXBP2, SUFU, TERC, TERT, XVNA405, WJICAX36U, TP53, TPP2, TSC1, TSC2, VHL, WAS, WRN, WT1, XIAP    Cancer:  Yes  Melanoma of the back x1-2 and of leg x2  Treatment:  Surgeries only  Patient-endorsed risk factors:  Loved the " sun  Used tanning beds in high school  Current dermatologist:  Dr. Mani Padilla  Colon polyp:  N/A - No history of colonoscopy  Other benign tumor/mass:  Yes  Lipoma of arm, s/p excision  Adnexal mass -- Ovarian cysts(?), 01-02/2021   Uterine fibroids (patient believes)  Pancreatitis:  No    Focused Surgical History  Reproductive organs intact    Ancestry  Ashkenazi Sikh ancestry:  Maybe mom's side Sikh ancestry, but no idea if Ashkenazi Sikh  Consanguinity:  No    Family Oncologic History  ** The pedigree below was placed into this note in a size that produced a legible font.  If it is appearing small/illegible on your screen, expand this note window horizontally. **      Hereditary cancer genetic testing in blood relatives:  None known  No biological children.  Of note, patient's two nephews share a biological mother.  Other than noted, no known history of cancer in relatives depicted in the pedigree or in:  maternal extended family, maternal extended family.  Other than noted, no known family history of colon polyps, lipomas, or other benign tumors.    Review of Systems  See HPI.    Patient's Distress Score today was 8/10 (with 10 being the worst).  Patient attributes this to moving, buying a house, traveling.  Patient denies experiencing suicidal or homicidal ideations (SI/HI).  Offered patient a referral to Behavioral Health, and patient declined.      OBJECTIVE      Past Medical History:   Diagnosis Date    Abnormal Pap smear     Abnormal Pap smear of cervix     ADHD (attention deficit hyperactivity disorder)     Depression     Patient feels resolved as of 9/19/22    Deviated septum     Fibroids     Herpes simplex without mention of complication     oral as per pt    HPV (human papilloma virus) anogenital infection     Insomnia     Malignant melanoma of skin, unspecified     x3-4 occurrences    Mental disorder      Patient Active Problem List    Diagnosis Date Noted    Gene mutation:  RAD50 c.94dup  "heterozygous pathogenic 09/08/2022    Pelvic floor tension 03/25/2021    Stress incontinence 03/25/2021    Nocturia more than twice per night 03/25/2021    Hip joint pain 03/25/2021    Left hip pain 02/03/2021    At high risk for breast cancer 03/13/2019    Burning sensation of vulva 11/10/2016    Pruritus of vulva 11/10/2016    Possible Contact dermatitis 11/10/2016      Physical Exam  Very pleasant patient.  Unaccompanied.  Vitals signs:  Reviewed:  Vitals:    09/19/22 1415   PainSc: 0-No pain   Constitutional      Appearance:  Appears well developed and well nourished. No distress.   Pulmonary     Effort:  Normal.  Neurological     Mental Status:  Alert and oriented.     Coordination:  Normal.   Psychiatric         Mood and Affect:  Normal.     Thought Content:  Normal.     Speech:  Normal.     Behavior:  Normal.     Judgment:  Normal.  Genetics-specific     It is my assessment that the patient is ready to proceed with cancer genetic testing from a psychosocial perspective.    ASSESSMENT / PLAN      Natalia Lee ("Natalia"), 41 y.o., is an established patient of mine who initially presented for cancer genetic risk assessment on 08/18/2022 after having been referred by Siri Rowan NP, with the [facility], given the family cancer history.  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      RAD50 Gene Mutation    Results  Natalia carries a single (i.e., heterozygous or monoallelic) pathogenic variant (henceforth herein referred to as a mutation) in her RAD50 gene; specifically, she is a heterozygous carrier of RAD50 c.94dup (p.Aqm40Vnjun*16).  RAD50 is a cancer susceptibility gene that, when functioning normally, helps with DNA damage repair.  When RAD50 is mutated, it cannot perform its functions as intended.  As a heterozygous carrier of RAD50 gene " mutationNatalia is a carrier of autosomal-recessively inherited conditions associated with RAD50 but is not directly affected by said conditions.  Individuals have two copies of every gene--a copy from each biological parent.  One mutated copy of a gene is sufficient to predispose an individual to autosomal-dominantly inherited conditions associated with that gene.  Both copies of the gene need to be mutated in order for the individual to develop autosomal-recessively inherited conditions associated with that gene.    RAD50 Mutation-Associated Risks  Autosomal-dominantly inherited:  While earlier studies had suggested that female RAD50 mutation carriers may have a predisposition to breast cancer, more recent data have not supported this.  Autosomal-recessively inherited:  Nijmegen breakage syndrome-like disorder (NBSLD), a condition characterized by severely slowed prenatal growth and persistent  growth restriction, congenital microcephaly, borderline- to mildly impaired intellectual development, myelodysplasia (myelodysplastic syndrome, preleukemia), and/or early neurodegeneration.    Reproductive Implications  When both reproductive partners carry any mutation in their RAD50 gene, each of their offspring has a 25% chance of inheriting both parents' RAD50 mutations, thereby being a biallelic RAD50 mutation carrier, which places that offspring at significant risk for NBSLD.  Counseling for reproductive options, which may include prenatal genetic diagnosis and/or assisted reproduction with preimplantation genetic testing, is indicated for individuals/couples expressing concern over future offspring's [id number] risks.     Risks to Relatives  It is suspected that this RAD50 mutation was inherited from a parent; this suspicion can reasonably be validated when one parent tests positive for the same mutation and the other parent tests negative for that mutation.  Children and siblings of a RAD50  "monoallelic/heterozygous mutation carrier each independently have a 50% chance of inheriting or having inherited the same RAD50 mutation.  More distant relatives may also be at risk for carrying the mutation.  Both males and females can carry, be affected by, and pass down these mutations.  When passed down, these mutations are passed directly from parent to child and do not "skip generations."  When both reproductive partners carry any mutation in their RAD50 gene, each of their offspring has a 25% chance of inheriting both parents' RAD50 mutations, thereby being at significant risk for NBSLD.  Counseling for reproductive options, which may include prenatal genetic diagnosis and/or assisted reproduction with preimplantation genetic testing, is indicated for individuals/couples expressing concern over future offspring's [id number] risks.     RAD50 Mutation-Related Plan  For reproductive purposes:  Natalia expressed firm decidedness on not having children.  Still reviewed the information with her, and stressed importance of her sharing the information with relatives for their own reproductive purposes.  For the family:  A RAD50 Family Notification Letter is being sent via [facility] to the patient, for the patient to share with her relatives.  Follow up for RAD50 check-in in three (3) years.    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    Implications    In a gene, a VUS represents a change that is lacking evidence for the genetic testing company to classify as being benign (harmless) or pathogenic (harmful) at this time.  VUSs are quite common and are not typically acted upon clinically.  Moving forward, if your genetic testing company reclassifies your VUS--for instance, as "benign" or as "pathogenic"--at " that time, notification will be provided.    While Natalia is not known at this time to carry any pathogenic (i.e., harmful) variants (also known as mutations) in any of the following genes, the below is provided for reference:  Mutations in the IL2RB gene can be associated with:  Autosomal-recessively inherited risk* for:  Immunodeficiency with lymphoproliferation and autoimmunity due to  deficiency  * Terms and definitions:  Autosomal-recessively inherited risk:  Carrying only one mutated copy of the gene is not sufficient for the condition to develop; rather, both copies of the gene must be mutated.    Patient has hard copy of [id number] and of RAD50 Family Notification Letter; informed patient that I sent the latter electronically to patient, as well.      ICD-10-CM ICD-9-CM   1. Encounter for nonprocreative genetic counseling  Z71.83 V26.33   2. Carrier of genetic disorder  Z14.8 V83.89   3. History of melanoma  Z85.820 V10.82   4. Family history of breast cancer  Z80.3 V16.3   5. Family history of colon cancer  Z80.0 V16.0   6. Family history of colonic polyps  Z83.71 V18.51     1. Encounter for nonprocreative genetic counseling  - As above    2. Carrier of genetic disorder  - Positive for heterozygous mutation of RAD50, as above    3. History of melanoma  - Continue surveillance and total-body skin exams as directed by established dermatologist, Dr. Mani Padilla    4. Family history of breast cancer  - Continue breast cancer screenings and risk management as directed by established high-risk breast specialist, Siri Rowan NP, [facility]    5. Family history of colon cancer  - Colonoscopies for colorectal cancer screening purposes are to begin by age 45 years (See #6 below, as well)    6. Family history of colonic polyps   - If any first-degree relative of Natalia's (i.e., her father, mother, or brother) has history of any of the following, sooner initiation of colonoscopy for  colorectal cancer screening purposes for Natalia:  Adenoma with high-grade dysplasia  Adenoma or sessile serrated polyp/adenoma 1 cm or larger in size  Villous or tubulovillous adenoma  Traditional serrated adenoma (TSA)  Sessile serrated polyp/adenoma with any dysplasia  Colonic adenomatous polyposis of unknown etiology (CPUE)      Follow-up:  Follow up in about 3 years (around 9/19/2025) for RAD50 gene updates and determination of need for updated genetic testing.    Questions were encouraged and answered to the patient's satisfaction, and she verbalized understanding of the information and agreement with the plan.           Approximately 29 minutes were spent face-to-face with the patient.  Approximately 55 minutes in total were spent on this encounter, which includes face-to-face time and non-face-to-face time preparing to see the patient (e.g., review of tests), obtaining and/or reviewing separately obtained history, documenting clinical information in the electronic or other health record, independently interpreting results (not separately reported) and communicating results to the patient/family/caregiver, or care coordination (not separately reported).         Justin Dill, DNP, APRN, FNP-BC, AOCNP  Nurse Practitioner, Hereditary and High-Risk Clinic  Department of Hematology and Oncology  [facility]        Urgent Cancer Genetics Visit  Patient MRN [id number]    Appointment date and time 9/19/22 2:00 PM   Appointment type In-person   Chief Complaint Chief Complaint   Patient presents with    Follow-up     Genetic test results       Reason for expediting visit gene mutation in patient   Appointment scheduled via Patient-provider telephone conversation   Intervention Post-test genetic counseling   Disposition Discharged to home; follow up in 3 years or PRN sooner   Time spent on encounter 55 minutes   Was the expediting of this visit ultimately appropriate?   yes

## 2022-09-19 ENCOUNTER — OFFICE VISIT (OUTPATIENT)
Dept: HEMATOLOGY/ONCOLOGY | Facility: CLINIC | Age: 42
End: 2022-09-19
Payer: COMMERCIAL

## 2022-09-19 ENCOUNTER — PATIENT MESSAGE (OUTPATIENT)
Dept: HEMATOLOGY/ONCOLOGY | Facility: CLINIC | Age: 42
End: 2022-09-19

## 2022-09-19 ENCOUNTER — CLINICAL SUPPORT (OUTPATIENT)
Dept: INTERNAL MEDICINE | Facility: CLINIC | Age: 42
End: 2022-09-19

## 2022-09-19 DIAGNOSIS — Z14.8 CARRIER OF GENETIC DISORDER: ICD-10-CM

## 2022-09-19 DIAGNOSIS — Z80.0 FAMILY HISTORY OF COLON CANCER: ICD-10-CM

## 2022-09-19 DIAGNOSIS — Z71.83 ENCOUNTER FOR NONPROCREATIVE GENETIC COUNSELING: Primary | ICD-10-CM

## 2022-09-19 DIAGNOSIS — Z83.719 FAMILY HISTORY OF COLONIC POLYPS: ICD-10-CM

## 2022-09-19 DIAGNOSIS — Z85.820 HISTORY OF MELANOMA: ICD-10-CM

## 2022-09-19 DIAGNOSIS — Z12.9 CANCER SCREENING: Primary | ICD-10-CM

## 2022-09-19 DIAGNOSIS — Z80.3 FAMILY HISTORY OF BREAST CANCER: ICD-10-CM

## 2022-09-19 PROCEDURE — 99999 PR PBB SHADOW E&M-EST. PATIENT-LVL III: CPT | Mod: PBBFAC,,, | Performed by: NURSE PRACTITIONER

## 2022-09-19 PROCEDURE — 1159F PR MEDICATION LIST DOCUMENTED IN MEDICAL RECORD: ICD-10-PCS | Mod: CPTII,S$GLB,, | Performed by: NURSE PRACTITIONER

## 2022-09-19 PROCEDURE — 1159F MED LIST DOCD IN RCRD: CPT | Mod: CPTII,S$GLB,, | Performed by: NURSE PRACTITIONER

## 2022-09-19 PROCEDURE — 99215 OFFICE O/P EST HI 40 MIN: CPT | Mod: S$GLB,,, | Performed by: NURSE PRACTITIONER

## 2022-09-19 PROCEDURE — 99215 PR OFFICE/OUTPT VISIT, EST, LEVL V, 40-54 MIN: ICD-10-PCS | Mod: S$GLB,,, | Performed by: NURSE PRACTITIONER

## 2022-09-19 PROCEDURE — 99999 PR PBB SHADOW E&M-EST. PATIENT-LVL I: ICD-10-PCS | Mod: PBBFAC,,,

## 2022-09-19 PROCEDURE — 99999 PR PBB SHADOW E&M-EST. PATIENT-LVL I: CPT | Mod: PBBFAC,,,

## 2022-09-19 PROCEDURE — 99999 PR PBB SHADOW E&M-EST. PATIENT-LVL III: ICD-10-PCS | Mod: PBBFAC,,, | Performed by: NURSE PRACTITIONER

## 2022-09-19 NOTE — Clinical Note
Follow up in about 3 years (around 9/19/2025) for RAD50 gene updates and determination of need for updated genetic testing.

## 2022-09-19 NOTE — LETTER
"     2022    Natalia Lee  [address] [address]         To:         Natalia Lee (1980)  [place]:   Natalia, please read the letter attached, and let me know if any questions/concerns; if no questions/concerns, please disseminate the letter to your relatives.     Justin Dill NP  Cancer Genetics                                                                                               2022     Cancer Genetics  Information for Relatives     Dear relative of Natalia Lee ("Natalia") ( 1980):     Natalia carries a germline, clinically significant variant in her RAD50 gene; specifically, she carries RAD50 c.94dup (p.Izw90Nnnjb*16), heterozygous, henceforth herein referred to as a mutation.  This was identified through [facility].       Information on the RAD50 gene and associated follow-up for Natalia's relatives is provided below; however, Natalia's relatives need to discuss this information with their own healthcare providers and genetic specialists.     The RAD50 gene is associated with increased risk for the following conditions:  Autosomal-recessively inherited risk, meaning that both copies of the RAD50 gene need to have a mutation for the risk to apply:  Nijmegen breakage syndrome-like disorder (NBSLD), a condition characterized by severely slowed prenatal growth and persistent  growth restriction, congenital microcephaly, borderline- to mildly impaired intellectual development, myelodysplasia (myelodysplastic syndrome, preleukemia), and/or early neurodegeneration.     Both males and females can carry a mutation in their RAD50 gene and may pass it down to their children.  When passed down, the mutation would be passed directly from parent to child.  Each of Natalia's first-degree relatives has a 50% chance of also carrying Natalia's RAD50 mutation, while other, more distant blood relatives can also be at risk for carrying " the mutation.       Furthermore, when both reproductive partners (parents) carry one RAD50 mutation, each of their offspring has a 25% chance of inheriting both mutations, thereby being at significant risk for NBSLD.  RAD50 mutation carriers of reproductive age may have reproductive options to consider utilizing, such as prenatal genetic diagnosis and/or assisted reproduction utilizing preimplantation genetic diagnosis.     It is recommended that relatives of Trista meet with a genetics professional for genetic counseling and potentially testing, for their own health purposes and potentially also for reproductive purposes.    For individuals located in [place]:  [facility] has cancer genetics professionals with whom appointments can be made by calling [phone].    For individuals located outside of [place]:  For cancer and/or reproductive genetics purposes, genetic professionals nearest to your location can be located by visiting [email or web address].    [facility] offers complimentary RAD50 family variant testing to Natalia's blood relatives, so long as the order is placed by February 6, 2023, and the specimen is received by [facility] by February 16, 2023.  The relative should bring this letter to their pre-test appointment as it contains information necessary for participation in the family variant testing program.  If your healthcare provider needs assistance in ordering the test, they can call [facility] at [phone].    Please note that only the single-gene test is complimentary, while the associated office visits are billable.    The healthcare provider ordering the genetic testing for Natalia's relative may need to know the following information:  Natalia's [facility] patient number, which is [id number];    Natalia's date of birth; and   The specific RAD50 mutation that Natalia carries, which is mentioned above.      Of note, Natalia's relatives can carry mutations in addition to and/or  other than those identified in Natalia, which is another reason why it's important to first meet with a genetics professional prior to undergoing genetic testing.     Please let Natalia know of your genetic testing results so that she can inform me and I can provide further guidance for the family.     Please don't hesitate to call with questions pertaining to logistics of your testing, etc.                                                                                                                         Sincerely,          Justin Dill, LUDIN, APRN, FNP-BC, AOCNP  Nurse Practitioner, Hereditary and High-Risk Clinic  Department of Hematology and Oncology  [facility]    Phone:  [phone]

## 2022-10-03 ENCOUNTER — OFFICE VISIT (OUTPATIENT)
Dept: OBSTETRICS AND GYNECOLOGY | Facility: CLINIC | Age: 42
End: 2022-10-03
Payer: COMMERCIAL

## 2022-10-03 VITALS
WEIGHT: 140.31 LBS | DIASTOLIC BLOOD PRESSURE: 82 MMHG | HEIGHT: 68 IN | SYSTOLIC BLOOD PRESSURE: 130 MMHG | BODY MASS INDEX: 21.26 KG/M2

## 2022-10-03 DIAGNOSIS — Z01.419 ENCOUNTER FOR GYNECOLOGICAL EXAMINATION: Primary | ICD-10-CM

## 2022-10-03 DIAGNOSIS — Z12.4 CERVICAL CANCER SCREENING: ICD-10-CM

## 2022-10-03 DIAGNOSIS — G47.09 OTHER INSOMNIA: ICD-10-CM

## 2022-10-03 DIAGNOSIS — Z11.51 ENCOUNTER FOR SCREENING FOR HUMAN PAPILLOMAVIRUS (HPV): ICD-10-CM

## 2022-10-03 PROBLEM — J30.2 SEASONAL ALLERGIES: Status: ACTIVE | Noted: 2022-03-18

## 2022-10-03 PROBLEM — F98.8 ADD (ATTENTION DEFICIT DISORDER): Status: ACTIVE | Noted: 2018-07-05

## 2022-10-03 PROBLEM — F41.9 ANXIETY: Status: ACTIVE | Noted: 2018-07-05

## 2022-10-03 PROCEDURE — 3075F SYST BP GE 130 - 139MM HG: CPT | Mod: CPTII,S$GLB,, | Performed by: STUDENT IN AN ORGANIZED HEALTH CARE EDUCATION/TRAINING PROGRAM

## 2022-10-03 PROCEDURE — 3075F PR MOST RECENT SYSTOLIC BLOOD PRESS GE 130-139MM HG: ICD-10-PCS | Mod: CPTII,S$GLB,, | Performed by: STUDENT IN AN ORGANIZED HEALTH CARE EDUCATION/TRAINING PROGRAM

## 2022-10-03 PROCEDURE — 3079F DIAST BP 80-89 MM HG: CPT | Mod: CPTII,S$GLB,, | Performed by: STUDENT IN AN ORGANIZED HEALTH CARE EDUCATION/TRAINING PROGRAM

## 2022-10-03 PROCEDURE — 3008F BODY MASS INDEX DOCD: CPT | Mod: CPTII,S$GLB,, | Performed by: STUDENT IN AN ORGANIZED HEALTH CARE EDUCATION/TRAINING PROGRAM

## 2022-10-03 PROCEDURE — 3008F PR BODY MASS INDEX (BMI) DOCUMENTED: ICD-10-PCS | Mod: CPTII,S$GLB,, | Performed by: STUDENT IN AN ORGANIZED HEALTH CARE EDUCATION/TRAINING PROGRAM

## 2022-10-03 PROCEDURE — 1160F RVW MEDS BY RX/DR IN RCRD: CPT | Mod: CPTII,S$GLB,, | Performed by: STUDENT IN AN ORGANIZED HEALTH CARE EDUCATION/TRAINING PROGRAM

## 2022-10-03 PROCEDURE — 1159F PR MEDICATION LIST DOCUMENTED IN MEDICAL RECORD: ICD-10-PCS | Mod: CPTII,S$GLB,, | Performed by: STUDENT IN AN ORGANIZED HEALTH CARE EDUCATION/TRAINING PROGRAM

## 2022-10-03 PROCEDURE — 99396 PREV VISIT EST AGE 40-64: CPT | Mod: S$GLB,,, | Performed by: STUDENT IN AN ORGANIZED HEALTH CARE EDUCATION/TRAINING PROGRAM

## 2022-10-03 PROCEDURE — 99999 PR PBB SHADOW E&M-EST. PATIENT-LVL III: ICD-10-PCS | Mod: PBBFAC,,, | Performed by: STUDENT IN AN ORGANIZED HEALTH CARE EDUCATION/TRAINING PROGRAM

## 2022-10-03 PROCEDURE — 99396 PR PREVENTIVE VISIT,EST,40-64: ICD-10-PCS | Mod: S$GLB,,, | Performed by: STUDENT IN AN ORGANIZED HEALTH CARE EDUCATION/TRAINING PROGRAM

## 2022-10-03 PROCEDURE — 1159F MED LIST DOCD IN RCRD: CPT | Mod: CPTII,S$GLB,, | Performed by: STUDENT IN AN ORGANIZED HEALTH CARE EDUCATION/TRAINING PROGRAM

## 2022-10-03 PROCEDURE — 1160F PR REVIEW ALL MEDS BY PRESCRIBER/CLIN PHARMACIST DOCUMENTED: ICD-10-PCS | Mod: CPTII,S$GLB,, | Performed by: STUDENT IN AN ORGANIZED HEALTH CARE EDUCATION/TRAINING PROGRAM

## 2022-10-03 PROCEDURE — 3079F PR MOST RECENT DIASTOLIC BLOOD PRESSURE 80-89 MM HG: ICD-10-PCS | Mod: CPTII,S$GLB,, | Performed by: STUDENT IN AN ORGANIZED HEALTH CARE EDUCATION/TRAINING PROGRAM

## 2022-10-03 PROCEDURE — 88175 CYTOPATH C/V AUTO FLUID REDO: CPT | Performed by: STUDENT IN AN ORGANIZED HEALTH CARE EDUCATION/TRAINING PROGRAM

## 2022-10-03 PROCEDURE — 99999 PR PBB SHADOW E&M-EST. PATIENT-LVL III: CPT | Mod: PBBFAC,,, | Performed by: STUDENT IN AN ORGANIZED HEALTH CARE EDUCATION/TRAINING PROGRAM

## 2022-10-03 PROCEDURE — 87624 HPV HI-RISK TYP POOLED RSLT: CPT | Performed by: STUDENT IN AN ORGANIZED HEALTH CARE EDUCATION/TRAINING PROGRAM

## 2022-10-03 NOTE — PROGRESS NOTES
Chief Complaint: Well Woman Exam     HPI:      Natalia Lee is a 41 y.o.  who presents today for well woman exam.  LMP: Patient's last menstrual period was 2022.  Today she reports she is interested in testosterone pellets - her friend started them and overall feels so much better including sleep improvement. Patient has had trouble with sleep for years. She has been on progesterone, magnesium, melatonin, lunesta, etc. She is currently on the progesterone cream on wrist nightly, which does help some but overall she still does not get restorative sleep.  No other issues, problems, or complaints. Specifically, patient denies abnormal vaginal bleeding, discharge, pelvic pain, urinary problems, or changes in appetite. Ms. Lee is currently sexually active with a single male partner. She declines STD screening today.    Previous Pap: NILM, HPV negative (21)  Previous Mammogram: BiRads: 1 T-C Score: 28.9 (21) MRI BiRads 1 (22)    Past Medical History:   Diagnosis Date    Abnormal Pap smear     Abnormal Pap smear of cervix     ADHD (attention deficit hyperactivity disorder)     Depression     Patient feels resolved as of 22    Deviated septum     Fibroids     Herpes simplex without mention of complication     oral as per pt    HPV (human papilloma virus) anogenital infection     Insomnia     Malignant melanoma of skin, unspecified     x3-4 occurrences    Mental disorder      Past Surgical History:   Procedure Laterality Date    BUNIONECTOMY Right 2019    right     CERVICAL BIOPSY  W/ LOOP ELECTRODE EXCISION  2010    COLPOSCOPY  2018    TONSILLECTOMY, ADENOIDECTOMY  2006     Social History     Socioeconomic History    Marital status:    Tobacco Use    Smoking status: Never    Smokeless tobacco: Never   Substance and Sexual Activity    Alcohol use: Yes    Drug use: No    Sexual activity: Yes     Partners: Male     Birth control/protection: Partner-Vasectomy     Comment:  "Single:      OB History          0    Para        Term   0            AB        Living   0         SAB        IAB        Ectopic        Multiple        Live Births                     Physical Exam:      PHYSICAL EXAM:  /82 (BP Location: Left arm, Patient Position: Sitting, BP Method: Medium (Manual))   Ht 5' 8" (1.727 m)   Wt 63.6 kg (140 lb 5.2 oz)   LMP 2022   BMI 21.34 kg/m²   Body mass index is 21.34 kg/m².     APPEARANCE: Well nourished, well developed, in no acute distress.  PSYCH: Appropriate mood and affect.  SKIN: No acne or hirsutism  NECK: Neck symmetric without masses  NODES: No inguinal, axillary, or supraclavicular lymph node enlargement  ABDOMEN: Soft.  No tenderness or masses.    CARDIOVASCULAR: No edema of peripheral extremities  BREASTS: Symmetrical, no visible skin lesions. No palpable masses. No nipple discharge bilaterally.  PELVIC: Normal external genitalia without lesions.  Normal hair distribution.  Adequate perineal body, normal urethral meatus.  Vagina moist and well rugated. Without lesions. Without discharge.  Cervix pink, without lesions, discharge or tenderness.  No significant cystocele or rectocele.  Bimanual exam shows uterus to be normal size, regular, mobile and nontender.  Adnexa without masses or tenderness.      Assessment/Plan:     Encounter for gynecological examination  - pelvic exam normal  - pap/hpv today (has been getting them yearly and desires to continue)  - MMG/MRI q6mos per breast specialist due to increased breast ca risk  - pt desires referral to discuss testosterone pellets, Referral placed  - basic labs with PCP  - continue topical progesterone for insomnia    Other insomnia  -     Ambulatory referral/consult to [facility]; Future; Expected date: 10/10/2022    Cervical cancer screening  -     Liquid-Based Pap Smear, Screening    Encounter for screening for human papillomavirus (HPV)  -     HPV High Risk " Genotypes, PCR      Follow up in about 1 year (around 10/3/2023) for annual.    Counseling:     Patient was counseled today on current ASCCP pap guidelines, the recommendation for yearly physical exams, safe driving habits, breast self awareness. She is to see her PCP for other health maintenance.     Use of the [facility] Patient Portal discussed and encouraged during today's visit.     Maria Luz Sanchez MD  Obstetrics and Gynecology  [facility]

## 2022-10-05 ENCOUNTER — PATIENT MESSAGE (OUTPATIENT)
Dept: OBSTETRICS AND GYNECOLOGY | Facility: CLINIC | Age: 42
End: 2022-10-05
Payer: COMMERCIAL

## 2022-10-06 ENCOUNTER — PATIENT MESSAGE (OUTPATIENT)
Dept: OBSTETRICS AND GYNECOLOGY | Facility: CLINIC | Age: 42
End: 2022-10-06
Payer: COMMERCIAL

## 2022-10-27 ENCOUNTER — PATIENT MESSAGE (OUTPATIENT)
Dept: HEMATOLOGY/ONCOLOGY | Facility: CLINIC | Age: 42
End: 2022-10-27
Payer: COMMERCIAL

## 2022-10-27 ENCOUNTER — PATIENT MESSAGE (OUTPATIENT)
Dept: INTERNAL MEDICINE | Facility: CLINIC | Age: 42
End: 2022-10-27
Payer: COMMERCIAL

## 2022-11-08 ENCOUNTER — TELEPHONE (OUTPATIENT)
Dept: OBSTETRICS AND GYNECOLOGY | Facility: CLINIC | Age: 42
End: 2022-11-08
Payer: COMMERCIAL

## 2022-11-08 NOTE — TELEPHONE ENCOUNTER
----- Message from Bebe Peterson sent at 11/8/2022 10:00 AM CST -----  Pt called to see if she can be seen sooner on 11/11/22. Pls call the pt and advise.

## 2022-11-14 ENCOUNTER — HOSPITAL ENCOUNTER (OUTPATIENT)
Dept: RADIOLOGY | Facility: OTHER | Age: 42
Discharge: HOME | End: 2022-11-14
Attending: OBSTETRICS & GYNECOLOGY
Payer: COMMERCIAL

## 2022-11-14 ENCOUNTER — PATIENT MESSAGE (OUTPATIENT)
Dept: OBSTETRICS AND GYNECOLOGY | Facility: CLINIC | Age: 42
End: 2022-11-14
Payer: COMMERCIAL

## 2022-11-14 DIAGNOSIS — Z12.31 VISIT FOR SCREENING MAMMOGRAM: ICD-10-CM

## 2022-11-14 PROCEDURE — 77063 BREAST TOMOSYNTHESIS BI: CPT | Mod: TC

## 2022-11-14 PROCEDURE — 77063 BREAST TOMOSYNTHESIS BI: CPT | Mod: 26,,, | Performed by: RADIOLOGY

## 2022-11-14 PROCEDURE — 77067 MAMMO DIGITAL SCREENING BILAT WITH TOMO: ICD-10-PCS | Mod: 26,,, | Performed by: RADIOLOGY

## 2022-11-14 PROCEDURE — 77067 SCR MAMMO BI INCL CAD: CPT | Mod: 26,,, | Performed by: RADIOLOGY

## 2022-11-14 PROCEDURE — 77063 MAMMO DIGITAL SCREENING BILAT WITH TOMO: ICD-10-PCS | Mod: 26,,, | Performed by: RADIOLOGY

## 2022-11-14 RX ORDER — FLUCONAZOLE 150 MG/1
150 TABLET ORAL DAILY
Qty: 1 TABLET | Refills: 2 | Status: SHIPPED | OUTPATIENT
Start: 2022-11-14 | End: 2022-11-15

## 2022-11-23 ENCOUNTER — OFFICE VISIT (OUTPATIENT)
Dept: OBSTETRICS AND GYNECOLOGY | Facility: CLINIC | Age: 42
End: 2022-11-23
Payer: COMMERCIAL

## 2022-11-23 VITALS
WEIGHT: 139.56 LBS | BODY MASS INDEX: 21.15 KG/M2 | DIASTOLIC BLOOD PRESSURE: 72 MMHG | HEIGHT: 68 IN | SYSTOLIC BLOOD PRESSURE: 124 MMHG

## 2022-11-23 DIAGNOSIS — Z71.89 COUNSELING FOR HORMONE REPLACEMENT THERAPY: Primary | ICD-10-CM

## 2022-11-23 PROCEDURE — 1159F PR MEDICATION LIST DOCUMENTED IN MEDICAL RECORD: ICD-10-PCS | Mod: CPTII,S$GLB,, | Performed by: STUDENT IN AN ORGANIZED HEALTH CARE EDUCATION/TRAINING PROGRAM

## 2022-11-23 PROCEDURE — 3074F PR MOST RECENT SYSTOLIC BLOOD PRESSURE < 130 MM HG: ICD-10-PCS | Mod: CPTII,S$GLB,, | Performed by: STUDENT IN AN ORGANIZED HEALTH CARE EDUCATION/TRAINING PROGRAM

## 2022-11-23 PROCEDURE — 1159F MED LIST DOCD IN RCRD: CPT | Mod: CPTII,S$GLB,, | Performed by: STUDENT IN AN ORGANIZED HEALTH CARE EDUCATION/TRAINING PROGRAM

## 2022-11-23 PROCEDURE — 3074F SYST BP LT 130 MM HG: CPT | Mod: CPTII,S$GLB,, | Performed by: STUDENT IN AN ORGANIZED HEALTH CARE EDUCATION/TRAINING PROGRAM

## 2022-11-23 PROCEDURE — 3008F PR BODY MASS INDEX (BMI) DOCUMENTED: ICD-10-PCS | Mod: CPTII,S$GLB,, | Performed by: STUDENT IN AN ORGANIZED HEALTH CARE EDUCATION/TRAINING PROGRAM

## 2022-11-23 PROCEDURE — 99213 OFFICE O/P EST LOW 20 MIN: CPT | Mod: S$GLB,,, | Performed by: STUDENT IN AN ORGANIZED HEALTH CARE EDUCATION/TRAINING PROGRAM

## 2022-11-23 PROCEDURE — 99213 PR OFFICE/OUTPT VISIT, EST, LEVL III, 20-29 MIN: ICD-10-PCS | Mod: S$GLB,,, | Performed by: STUDENT IN AN ORGANIZED HEALTH CARE EDUCATION/TRAINING PROGRAM

## 2022-11-23 PROCEDURE — 3078F DIAST BP <80 MM HG: CPT | Mod: CPTII,S$GLB,, | Performed by: STUDENT IN AN ORGANIZED HEALTH CARE EDUCATION/TRAINING PROGRAM

## 2022-11-23 PROCEDURE — 3078F PR MOST RECENT DIASTOLIC BLOOD PRESSURE < 80 MM HG: ICD-10-PCS | Mod: CPTII,S$GLB,, | Performed by: STUDENT IN AN ORGANIZED HEALTH CARE EDUCATION/TRAINING PROGRAM

## 2022-11-23 PROCEDURE — 3008F BODY MASS INDEX DOCD: CPT | Mod: CPTII,S$GLB,, | Performed by: STUDENT IN AN ORGANIZED HEALTH CARE EDUCATION/TRAINING PROGRAM

## 2022-11-23 PROCEDURE — 99999 PR PBB SHADOW E&M-EST. PATIENT-LVL III: ICD-10-PCS | Mod: PBBFAC,,, | Performed by: STUDENT IN AN ORGANIZED HEALTH CARE EDUCATION/TRAINING PROGRAM

## 2022-11-23 PROCEDURE — 99999 PR PBB SHADOW E&M-EST. PATIENT-LVL III: CPT | Mod: PBBFAC,,, | Performed by: STUDENT IN AN ORGANIZED HEALTH CARE EDUCATION/TRAINING PROGRAM

## 2022-11-23 NOTE — PROGRESS NOTES
History & Physical  Gynecology      SUBJECTIVE:     Chief Complaint: hormones       History of Present Illness:  Natalia Lee is a 42 y.o. F who presents to discuss hormone therapy. She has a friend who recently started testosterone therapy who feels great. She is interested in learning more about this. States she frequently feels that her head is in fog. Ms. Lee currently uses topical progesterone for sleep and this works very well for her. She has regular, monthly cycles with normal flow. She does have hot flashes, particularly at night. She does not have any issues with sex drive.       Review of patient's allergies indicates:   Allergen Reactions    Sulfa (sulfonamide antibiotics) Rash       Past Medical History:   Diagnosis Date    Abnormal Pap smear     Abnormal Pap smear of cervix     ADHD (attention deficit hyperactivity disorder)     Depression     Patient feels resolved as of 21    Deviated septum     Fibroids     Herpes simplex without mention of complication     oral as per pt    HPV (human papilloma virus) anogenital infection     Insomnia     Malignant melanoma of skin, unspecified     x3-4 occurrences    Mental disorder      Past Surgical History:   Procedure Laterality Date    BUNIONECTOMY Right 2019    right     CERVICAL BIOPSY  W/ LOOP ELECTRODE EXCISION  2010    COLPOSCOPY  2018    TONSILLECTOMY, ADENOIDECTOMY       OB History          0    Para        Term   0            AB        Living   0         SAB        IAB        Ectopic        Multiple        Live Births                   Family History   Problem Relation Age of Onset    Other Mother         (-) for factor V Leiden    Fibroids Mother         pt suspects (uterine)    Thyroid nodules Mother     Lymphoma Paternal Aunt         (type?)    Colonic polyp Father     Breast cancer Paternal Aunt 50        uni/bilat?    Heart attack Paternal Grandfather     Lymphoma Paternal Grandmother         of the colon; dx  80s?    Cancer Maternal Grandfather 68        colon    Factor V Leiden deficiency Maternal Aunt     Thyroid cancer Maternal Grandmother 40    Colonic polyp Brother         pt believes    Thyroid cancer Maternal Aunt 58    Pulmonary embolism Maternal Cousin         2/2 factor V Leiden; traveled to brain    Factor V Leiden deficiency Maternal Cousin     Allergy (severe) Maternal Cousin         sulfa, almost  as a baby    Cancer Other         origin? (throat?)     Social History     Tobacco Use    Smoking status: Never    Smokeless tobacco: Never   Substance Use Topics    Alcohol use: Yes     Comment: social    Drug use: No       Current Outpatient Medications   Medication Sig    atovaquone-proguaniL (MALARONE) 250-100 mg Tab Take one tablet daily for malaria prevention. Begin one day before entering malarious area and continue for 1 week after return.    clindamycin (CLEOCIN T) 1 % Swab Apply 1 each topically 2 (two) times daily.    dapsone (ACZONE) 5 % topical gel Apply topically 2 (two) times daily.    dextroamphetamine-amphetamine 30 mg Tab Take 1 tablet by mouth 2 (two) times daily.    eszopiclone (LUNESTA) 3 mg Tab TAKE TWO TABLET BY MOUTH AT BEDTIME AS NEEDED FOR sleep    ivermectin (SOOLANTRA) 1 % Crea Apply topically.    nystatin (MYCOSTATIN) ointment Apply pea-sized amount to affected areas twice daily (mix with pea-sized amount of Triamcinolone ointment before applying).    PROGESTERONE MISC Apply topically. Every night, on wrist    RETIN-A MICRO PUMP 0.06 % GlwP     TAZORAC 0.05 % Crea cream 1 application every evening.    triamcinolone acetonide 0.1% (KENALOG) 0.1 % ointment Apply pea-sized amount to affected areas twice daily (mix with pea-sized amount of Nystatin ointment before applying).    valACYclovir (VALTREX) 500 MG tablet Take 1 tablet (500 mg total) by mouth 2 (two) times daily for 5-7 days as needed     No current facility-administered medications for this visit.         Review of  Systems:  Review of Systems   Constitutional:  Negative for fever.   Respiratory:  Negative for shortness of breath.    Cardiovascular:  Negative for chest pain.   Gastrointestinal:  Negative for abdominal pain, nausea and vomiting.   Endocrine: Positive for hot flashes.   Genitourinary:  Negative for decreased libido, menstrual problem and pelvic pain.   Neurological:  Negative for headaches.      OBJECTIVE:     Physical Exam:  Physical Exam  Vitals reviewed.   Constitutional:       General: She is not in acute distress.     Appearance: She is well-developed. She is not ill-appearing, toxic-appearing or diaphoretic.   HENT:      Head: Normocephalic and atraumatic.   Eyes:      Conjunctiva/sclera: Conjunctivae normal.   Cardiovascular:      Rate and Rhythm: Normal rate.   Pulmonary:      Effort: Pulmonary effort is normal. No respiratory distress.   Abdominal:      Palpations: Abdomen is soft.      Tenderness: There is no abdominal tenderness.   Musculoskeletal:         General: No tenderness. Normal range of motion.      Cervical back: Normal range of motion.   Skin:     General: Skin is warm and dry.   Neurological:      Mental Status: She is alert and oriented to person, place, and time.   Psychiatric:         Behavior: Behavior normal.         Thought Content: Thought content normal.         Judgment: Judgment normal.         ASSESSMENT:       ICD-10-CM ICD-9-CM    1. Counseling for hormone replacement therapy  Z71.89 V07.4              Plan:      -- Discussed perimenopause and expected course of symptoms.   -- Reviewed roles of testosterone therapy -- often used for low energy and decreased libido. Also explained potential side effects, including hair growth, deepening of the voice, clitoral enlargement. Routes of testosterone including sprays, creams, injections, pellets discussed.   -- Estrogen for hot flashes discussed as well.   -- She states her symptoms are manageable for now. If she decides to pursue  treatment, names of providers who do pellet therapy were given to her.       Betty Petersen MD

## 2022-11-27 ENCOUNTER — PATIENT MESSAGE (OUTPATIENT)
Dept: OBSTETRICS AND GYNECOLOGY | Facility: CLINIC | Age: 42
End: 2022-11-27
Payer: COMMERCIAL

## 2022-11-27 DIAGNOSIS — B37.31 VULVOVAGINAL CANDIDIASIS: ICD-10-CM

## 2022-11-28 RX ORDER — TRIAMCINOLONE ACETONIDE 1 MG/G
OINTMENT TOPICAL
Qty: 30 G | Refills: 0 | Status: SHIPPED | OUTPATIENT
Start: 2022-11-28

## 2023-04-04 ENCOUNTER — TELEPHONE (OUTPATIENT)
Dept: HEMATOLOGY/ONCOLOGY | Facility: CLINIC | Age: 43
End: 2023-04-04
Payer: COMMERCIAL

## 2023-04-04 ENCOUNTER — PATIENT MESSAGE (OUTPATIENT)
Dept: HEMATOLOGY/ONCOLOGY | Facility: CLINIC | Age: 43
End: 2023-04-04
Payer: COMMERCIAL

## 2023-04-04 DIAGNOSIS — Z15.89 GENE MUTATION: Primary | ICD-10-CM

## 2023-04-04 DIAGNOSIS — Z91.89 AT HIGH RISK FOR BREAST CANCER: ICD-10-CM

## 2023-04-04 DIAGNOSIS — Z80.3 FAMILY HISTORY OF BREAST CANCER: ICD-10-CM

## 2023-04-04 NOTE — TELEPHONE ENCOUNTER
"----- Message from Nicolas Preston sent at 4/4/2023  9:47 AM CDT -----  Consult/Advisory:          Name Of Caller: Self      Contact Preference?: [phone]     Provider Name: [facility]      Does patient feel the need to be seen today? No      What is the nature of the call?: Requesting orders for MRI Mammo          Additional Notes:  "Thank you for all that you do for our patients"      "

## 2023-07-05 ENCOUNTER — OFFICE VISIT (OUTPATIENT)
Dept: HEMATOLOGY/ONCOLOGY | Facility: CLINIC | Age: 43
End: 2023-07-05
Payer: COMMERCIAL

## 2023-07-05 ENCOUNTER — HOSPITAL ENCOUNTER (OUTPATIENT)
Dept: RADIOLOGY | Facility: HOSPITAL | Age: 43
Discharge: HOME | End: 2023-07-05
Attending: NURSE PRACTITIONER
Payer: COMMERCIAL

## 2023-07-05 VITALS
DIASTOLIC BLOOD PRESSURE: 76 MMHG | HEIGHT: 68 IN | OXYGEN SATURATION: 100 % | RESPIRATION RATE: 16 BRPM | SYSTOLIC BLOOD PRESSURE: 118 MMHG | HEART RATE: 70 BPM | WEIGHT: 136.25 LBS | BODY MASS INDEX: 20.65 KG/M2

## 2023-07-05 DIAGNOSIS — Z80.3 FAMILY HISTORY OF BREAST CANCER: ICD-10-CM

## 2023-07-05 DIAGNOSIS — R92.30 DENSE BREAST TISSUE ON MAMMOGRAM: ICD-10-CM

## 2023-07-05 DIAGNOSIS — Z91.89 AT HIGH RISK FOR BREAST CANCER: ICD-10-CM

## 2023-07-05 DIAGNOSIS — Z12.31 ENCOUNTER FOR SCREENING MAMMOGRAM FOR BREAST CANCER: ICD-10-CM

## 2023-07-05 DIAGNOSIS — Z91.89 AT HIGH RISK FOR BREAST CANCER: Primary | ICD-10-CM

## 2023-07-05 DIAGNOSIS — Z15.89 GENE MUTATION: ICD-10-CM

## 2023-07-05 PROCEDURE — 99999 PR PBB SHADOW E&M-EST. PATIENT-LVL IV: CPT | Mod: PBBFAC,,, | Performed by: NURSE PRACTITIONER

## 2023-07-05 PROCEDURE — 3074F PR MOST RECENT SYSTOLIC BLOOD PRESSURE < 130 MM HG: ICD-10-PCS | Mod: CPTII,S$GLB,, | Performed by: NURSE PRACTITIONER

## 2023-07-05 PROCEDURE — 99999 PR PBB SHADOW E&M-EST. PATIENT-LVL IV: ICD-10-PCS | Mod: PBBFAC,,, | Performed by: NURSE PRACTITIONER

## 2023-07-05 PROCEDURE — 3008F PR BODY MASS INDEX (BMI) DOCUMENTED: ICD-10-PCS | Mod: CPTII,S$GLB,, | Performed by: NURSE PRACTITIONER

## 2023-07-05 PROCEDURE — 3078F DIAST BP <80 MM HG: CPT | Mod: CPTII,S$GLB,, | Performed by: NURSE PRACTITIONER

## 2023-07-05 PROCEDURE — 3008F BODY MASS INDEX DOCD: CPT | Mod: CPTII,S$GLB,, | Performed by: NURSE PRACTITIONER

## 2023-07-05 PROCEDURE — 99214 PR OFFICE/OUTPT VISIT, EST, LEVL IV, 30-39 MIN: ICD-10-PCS | Mod: S$GLB,,, | Performed by: NURSE PRACTITIONER

## 2023-07-05 PROCEDURE — 77049 MRI BREAST C-+ W/CAD BI: CPT | Mod: TC

## 2023-07-05 PROCEDURE — 3078F PR MOST RECENT DIASTOLIC BLOOD PRESSURE < 80 MM HG: ICD-10-PCS | Mod: CPTII,S$GLB,, | Performed by: NURSE PRACTITIONER

## 2023-07-05 PROCEDURE — 77049 MRI BREAST C-+ W/CAD BI: CPT | Mod: 26,,, | Performed by: RADIOLOGY

## 2023-07-05 PROCEDURE — 1159F PR MEDICATION LIST DOCUMENTED IN MEDICAL RECORD: ICD-10-PCS | Mod: CPTII,S$GLB,, | Performed by: NURSE PRACTITIONER

## 2023-07-05 PROCEDURE — 25500020 PHARM REV CODE 255: Performed by: NURSE PRACTITIONER

## 2023-07-05 PROCEDURE — 77049 MRI BREAST W/WO CONTRAST, W/CAD, BILATERAL: ICD-10-PCS | Mod: 26,,, | Performed by: RADIOLOGY

## 2023-07-05 PROCEDURE — 1159F MED LIST DOCD IN RCRD: CPT | Mod: CPTII,S$GLB,, | Performed by: NURSE PRACTITIONER

## 2023-07-05 PROCEDURE — 99214 OFFICE O/P EST MOD 30 MIN: CPT | Mod: S$GLB,,, | Performed by: NURSE PRACTITIONER

## 2023-07-05 PROCEDURE — 3074F SYST BP LT 130 MM HG: CPT | Mod: CPTII,S$GLB,, | Performed by: NURSE PRACTITIONER

## 2023-07-05 PROCEDURE — A9577 INJ MULTIHANCE: HCPCS | Performed by: NURSE PRACTITIONER

## 2023-07-05 RX ADMIN — GADOBENATE DIMEGLUMINE 14 ML: 529 INJECTION, SOLUTION INTRAVENOUS at 10:07

## 2023-07-05 NOTE — PROGRESS NOTES
Chief Complaint   Patient presents with    high risk breast          HPI:   Natalia Lee is a 42 y.o. who presents for follow up of increased risk of breast cancer.    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   RAD50 Mutation-Associated Risks  Autosomal-dominantly inherited:  While earlier studies had suggested that female RAD50 mutation carriers may have a predisposition to breast cancer, more recent data have not supported this.    Today, Feels good and no complaints.   No breast concerns.      MRI breast today- pending.     2022 MMG:   Impression:   No mammographic evidence of malignancy.     BI-RADS Category 1: Negative     Recommendation:  Routine screening mammogram in 1 year is recommended.     Your estimated lifetime risk of breast cancer (to age 85) based on Tyrer-Cuzick risk assessment model is 14 %.  According to the [employer] Society, patients with a lifetime breast cancer risk of 20% or higher might benefit from supplemental screening tests. ??     High Risk Breast cancer specific history:  - Age: 42 y.o.   - Height:  5'8  - Weight: 142 lbs >136 lbs  - Breast density per BI-RADS:  d - Extremely dense  - Age at menarche:  13 yo  - Number of pregnancies: G0  - She is perimenopausal.   -Uterus and ovaries intact: Yes  - HRT: No but is on progesterone only for periods  - Genetic testing: No  - Personal history of cancer: Melanoma 2016   - Previous chest radiation exposure between ages 10-30 years old: No  - Personal history of breast biopsy: No  - [employer] Inheritance: No  - Family history of cancer:    Paternal aunt- breast cancer  59 yo  Paternal aunt- breast cancer  35 yo  Maternal grandfather- colon cancer  Paternal grandmother stomach cancer    Social History:  Tobacco use:  no  Alcohol use:  social  Exercise regimen: everyday- moving stairs and lifts weights  Employment: no      Past Medical   Past Medical History:   Diagnosis  Date    Abnormal Pap smear     Abnormal Pap smear of cervix     ADHD (attention deficit hyperactivity disorder)     Depression     Patient feels resolved as of 21    Deviated septum     Fibroids     Herpes simplex without mention of complication     oral as per pt    HPV (human papilloma virus) anogenital infection     Insomnia     Malignant melanoma of skin, unspecified     x3-4 occurrences    Mental disorder      Patient Active Problem List   Diagnosis    Burning sensation of vulva    Pruritus of vulva    Possible Contact dermatitis    At high risk for breast cancer    Left hip pain    Pelvic floor tension    Stress incontinence    Nocturia more than twice per night    Hip joint pain    Gene mutation:  RAD50 [id number] heterozygous pathogenic    ADD (attention deficit disorder)    Anxiety    Raynaud's syndrome without gangrene    Seasonal allergies    Other insomnia     Social History   Social History     Tobacco Use    Smoking status: Never    Smokeless tobacco: Never   Substance Use Topics    Alcohol use: Yes     Comment: social    Drug use: No     Family History  Family History   Problem Relation Age of Onset    Other Mother         (-) for factor V Leiden    Fibroids Mother         pt suspects (uterine)    Thyroid nodules Mother     Lymphoma Paternal Aunt         (type?)    Colonic polyp Father     Breast cancer Paternal Aunt 50        uni/bilat?    Heart attack Paternal Grandfather     Lymphoma Paternal Grandmother         of the colon; dx 80s?    Cancer Maternal Grandfather 68        colon    Factor V Leiden deficiency Maternal Aunt     Thyroid cancer Maternal Grandmother 40    Colonic polyp Brother         pt believes    Thyroid cancer Maternal Aunt 58    Pulmonary embolism Maternal Cousin         2/2 factor V Leiden; traveled to brain    Factor V Leiden deficiency Maternal Cousin     Allergy (severe) Maternal Cousin         sulfa, almost  as a baby    Cancer Other         origin? (throat?)  "    Medications    Current Outpatient Medications:     atovaquone-proguaniL (MALARONE) 250-100 mg Tab, Take one tablet daily for malaria prevention. Begin one day before entering malarious area and continue for 1 week after return., Disp: 40 tablet, Rfl: 0    clindamycin (CLEOCIN T) 1 % Swab, Apply 1 each topically 2 (two) times daily., Disp: , Rfl:     dapsone (ACZONE) 5 % topical gel, Apply topically 2 (two) times daily., Disp: , Rfl:     dextroamphetamine-amphetamine 30 mg Tab, Take 1 tablet by mouth 2 (two) times daily., Disp: , Rfl: 0    eszopiclone (LUNESTA) 3 mg Tab, TAKE TWO TABLET BY MOUTH AT BEDTIME AS NEEDED FOR sleep, Disp: , Rfl:     ivermectin (SOOLANTRA) 1 % Crea, Apply topically., Disp: , Rfl:     nystatin (MYCOSTATIN) ointment, Apply pea-sized amount to affected areas twice daily (mix with pea-sized amount of Triamcinolone ointment before applying)., Disp: 30 g, Rfl: 0    PROGESTERONE MISC, Apply topically. Every night, on wrist, Disp: , Rfl:     RETIN-A MICRO PUMP 0.06 % GlwP, , Disp: , Rfl:     TAZORAC 0.05 % Crea cream, 1 application every evening., Disp: , Rfl:     triamcinolone acetonide 0.1% (KENALOG) 0.1 % ointment, Apply pea-sized amount to affected areas twice daily (mix with pea-sized amount of Nystatin ointment before applying)., Disp: 30 g, Rfl: 0    valACYclovir (VALTREX) 500 MG tablet, Take 1 tablet (500 mg total) by mouth 2 (two) times daily for 5-7 days as needed, Disp: , Rfl:   No current facility-administered medications for this visit.  Allergies  Review of patient's allergies indicates:   Allergen Reactions    Sulfa (sulfonamide antibiotics) Rash       Review of Systems       See above   All other systems reviewed and are negative.    Objective:      Vitals:   Vitals:    07/05/23 1121   BP: 118/76   Pulse: 70   Resp: 16   SpO2: 100%   Weight: 61.8 kg (136 lb 3.9 oz)   Height: 5' 8" (1.727 m)     BMI: Body mass index is 20.72 kg/m².   Body surface area is 1.72 meters " squared.    Physical Exam  Vitals signs reviewed.   Constitutional:  Normal appearance. NAD.   HENT: Normocephalic.   Eyes: Pupils are equal, round, and reactive to light.   Cardiovascular: Normal rate.   Pulmonary: Pulmonary effort is normal.   Musculoskeletal: Normal range of motion.   Lymphadenopathy: No cervical adenopathy. No axillary adenopathy.   Skin: Skin is warm and dry.   Neurological:  She is alert and oriented to person, place, and time.   Psychiatric: Mood normal.     Breast Exam:  +dense breasts;difficult to assess. Upper outer quadrants with hard dense tissue - similar on both sides but left >right. ?mass LUOQ      Laboratory Data: reviewed most recent   Imaging: reviewed most recent        Assessment:     1. At high risk for breast cancer    2. Family history of breast cancer    3. Dense breast tissue on mammogram    4. Encounter for screening mammogram for breast cancer          Plan:     MRI breast pending.   MMG due 11/2023  MRI breast 5/2024  2 CBE annually- one per GYN an one here.  Life style modifications as previously discussed.   Encouraged breast awareness, including monthly breast self-exams.       Route Chart for Scheduling    Med Onc Chart Routing      Follow up with physician    Follow up with DWAINE . See me 5/2024   Infusion scheduling note    Injection scheduling note    Labs    Imaging   MMG due 11/2023; MRI breast 5/2024   Pharmacy appointment    Other referrals               Questions were encouraged and answered to patient's satisfaction, and patient verbalized understanding of information and agreement with the plan. Advised patient to RTC with any interval changes or concerns.      Patient is in agreement with the proposed treatment plan. All questions were answered to the patient's satisfaction. Pt knows to call clinic for any new or worsening symptoms and if anything is needed before the next clinic visit.      JIMBO TrevinoP-C  Hematology & Oncology  [facility] [place] [address]  ph. [phone]  Fax. [phone]       30 minutes of total time spent on the encounter, which includes face to face time and non-face to face time preparing to see the patient (eg, review of tests), Obtaining and/or reviewing separately obtained history, Documenting clinical information in the electronic or other health record, Independently interpreting results (not separately reported) and communicating results to the patient/family/caregiver, or Care coordination (not separately reported). \

## 2023-07-10 RX ORDER — FLUCONAZOLE 150 MG/1
150 TABLET ORAL DAILY
Qty: 7 TABLET | Refills: 0 | Status: SHIPPED | OUTPATIENT
Start: 2023-07-10 | End: 2023-07-17

## 2023-07-10 NOTE — TELEPHONE ENCOUNTER
Pt had annual today. Was too early. Rescheduled to October. Asking for Diflucan going to [place] for a few weeks and would like to just have a few on hand.

## 2023-07-11 ENCOUNTER — PATIENT MESSAGE (OUTPATIENT)
Dept: HEMATOLOGY/ONCOLOGY | Facility: CLINIC | Age: 43
End: 2023-07-11
Payer: COMMERCIAL

## 2023-07-14 ENCOUNTER — OFFICE VISIT (OUTPATIENT)
Dept: INFECTIOUS DISEASES | Facility: CLINIC | Age: 43
End: 2023-07-14

## 2023-07-14 ENCOUNTER — CLINICAL SUPPORT (OUTPATIENT)
Dept: INFECTIOUS DISEASES | Facility: CLINIC | Age: 43
End: 2023-07-14

## 2023-07-14 VITALS
BODY MASS INDEX: 20.41 KG/M2 | HEART RATE: 70 BPM | WEIGHT: 134.69 LBS | TEMPERATURE: 98 F | DIASTOLIC BLOOD PRESSURE: 71 MMHG | HEIGHT: 68 IN | SYSTOLIC BLOOD PRESSURE: 114 MMHG

## 2023-07-14 DIAGNOSIS — Z71.84 COUNSELING ABOUT TRAVEL: Primary | ICD-10-CM

## 2023-07-14 PROCEDURE — 90472 IMMUNIZATION ADMIN EACH ADD: CPT | Mod: S$GLB,,, | Performed by: INTERNAL MEDICINE

## 2023-07-14 PROCEDURE — 99999 PR PBB SHADOW E&M-EST. PATIENT-LVL III: ICD-10-PCS | Mod: PBBFAC,,, | Performed by: INTERNAL MEDICINE

## 2023-07-14 PROCEDURE — 99402 PR PREVENT COUNSEL,INDIV,30 MIN: ICD-10-PCS | Mod: S$GLB,,, | Performed by: INTERNAL MEDICINE

## 2023-07-14 PROCEDURE — 90691 TYPHOID VICPS VACCINE IM: ICD-10-PCS | Mod: S$GLB,,, | Performed by: INTERNAL MEDICINE

## 2023-07-14 PROCEDURE — 90471 TYPHOID VICPS VACCINE IM: ICD-10-PCS | Mod: S$GLB,,, | Performed by: INTERNAL MEDICINE

## 2023-07-14 PROCEDURE — 90472 TDAP VACCINE GREATER THAN OR EQUAL TO 7YO IM: ICD-10-PCS | Mod: S$GLB,,, | Performed by: INTERNAL MEDICINE

## 2023-07-14 PROCEDURE — 90715 TDAP VACCINE 7 YRS/> IM: CPT | Mod: S$GLB,,, | Performed by: INTERNAL MEDICINE

## 2023-07-14 PROCEDURE — 90715 TDAP VACCINE GREATER THAN OR EQUAL TO 7YO IM: ICD-10-PCS | Mod: S$GLB,,, | Performed by: INTERNAL MEDICINE

## 2023-07-14 PROCEDURE — 99999 PR PBB SHADOW E&M-EST. PATIENT-LVL III: CPT | Mod: PBBFAC,,, | Performed by: INTERNAL MEDICINE

## 2023-07-14 PROCEDURE — 99999 PR PBB SHADOW E&M-EST. PATIENT-LVL I: ICD-10-PCS | Mod: PBBFAC,,,

## 2023-07-14 PROCEDURE — 99999 PR PBB SHADOW E&M-EST. PATIENT-LVL I: CPT | Mod: PBBFAC,,,

## 2023-07-14 PROCEDURE — 90471 IMMUNIZATION ADMIN: CPT | Mod: S$GLB,,, | Performed by: INTERNAL MEDICINE

## 2023-07-14 PROCEDURE — 90691 TYPHOID VACCINE IM: CPT | Mod: S$GLB,,, | Performed by: INTERNAL MEDICINE

## 2023-07-14 PROCEDURE — 99402 PREV MED CNSL INDIV APPRX 30: CPT | Mod: S$GLB,,, | Performed by: INTERNAL MEDICINE

## 2023-07-14 RX ORDER — ATOVAQUONE AND PROGUANIL HYDROCHLORIDE 250; 100 MG/1; MG/1
TABLET, FILM COATED ORAL
Qty: 60 TABLET | Refills: 0 | Status: SHIPPED | OUTPATIENT
Start: 2023-07-14

## 2023-07-14 RX ORDER — AZITHROMYCIN 500 MG/1
TABLET, FILM COATED ORAL
Qty: 2 TABLET | Refills: 0 | Status: SHIPPED | OUTPATIENT
Start: 2023-07-14 | End: 2023-10-13

## 2023-07-14 NOTE — PROGRESS NOTES
INFECTIOUS DISEASE TRAVEL CLINIC  07/14/2023 2:55 PM    Subjective:      Chief Complaint:   Chief Complaint   Patient presents with    Travel Consult       History of Present Illness    Patient Natalia Lee is a 42 y.o. female who presents today for routine pretravel consultation.  The patient  denies pmhx.  The patient will be traveling to  [facility] on 8/1 - 8/18. The purpose of this trip is vacation/safari.  The patient will be lodging at hotels      The patient has travelled to the following other countries in the past; [place].  The patient reports that they are up to date on all their childhood vaccinations.  The patient reports receipt of the following travel related vaccinations; .      Have you ever received:  YES NO DATES  Routine Childhood vaccines  [x]         []       Influenza vaccine   [x]         []  10/2022   Prevnar    []         [x]     Pneumovax    []         [x]     Tetanus-diptheria -pertussis  [x]         []  2014   Hepatitis A vaccine series       [x]         []   2018  Hepatitis B vaccine series         [x]         []   2018  Meningitis vaccine   []         [x]     Zoster vaccine    []         [x]    Typhoid vaccine   []         [x] 2020, 2018  Yellow fever vaccine   []         [x]   Japanese encephalitis vaccine []         [x]   Rabies vaccine   []         [x]     Current Outpatient Medications on File Prior to Visit   Medication Sig Dispense Refill    atovaquone-proguaniL (MALARONE) 250-100 mg Tab Take one tablet daily for malaria prevention. Begin one day before entering malarious area and continue for 1 week after return. 40 tablet 0    clindamycin (CLEOCIN T) 1 % Swab Apply 1 each topically 2 (two) times daily.      dapsone (ACZONE) 5 % topical gel Apply topically 2 (two) times daily.      dextroamphetamine-amphetamine 30 mg Tab Take 1 tablet by mouth 2 (two) times daily.  0    eszopiclone (LUNESTA) 3 mg Tab TAKE TWO TABLET BY MOUTH AT BEDTIME AS NEEDED FOR sleep      fluconazole  (DIFLUCAN) 150 MG Tab Take 1 tablet (150 mg total) by mouth once daily. for 7 days 7 tablet 0    ivermectin (SOOLANTRA) 1 % Crea Apply topically.      nystatin (MYCOSTATIN) ointment Apply pea-sized amount to affected areas twice daily (mix with pea-sized amount of Triamcinolone ointment before applying). 30 g 0    PROGESTERONE MISC Apply topically. Every night, on wrist      RETIN-A MICRO PUMP 0.06 % GlwP       TAZORAC 0.05 % Crea cream 1 application every evening.      triamcinolone acetonide 0.1% (KENALOG) 0.1 % ointment Apply pea-sized amount to affected areas twice daily (mix with pea-sized amount of Nystatin ointment before applying). 30 g 0    valACYclovir (VALTREX) 500 MG tablet Take 1 tablet (500 mg total) by mouth 2 (two) times daily for 5-7 days as needed       No current facility-administered medications on file prior to visit.        Review of Symptoms:  Constitutional: Denies fevers, chills, or weakness.  Cardiovascular: Denies chest pain, palpitations  Respiratory: Denies shortness of breath, cough, hemoptysis  GI: Denies nausea/vomitting,, abd pain  : Denies dysuria  Musculoskeletal: Denies joint pain or myalgias.  Skin/breast: Denies rashes, lumps, lesions, or discharge.  Neurologic: Denies headache     Past Medical History:   Diagnosis Date    Abnormal Pap smear     Abnormal Pap smear of cervix     ADHD (attention deficit hyperactivity disorder)     Depression     Patient feels resolved as of 9/19/22    Deviated septum     Fibroids     Herpes simplex without mention of complication     oral as per pt    HPV (human papilloma virus) anogenital infection     Insomnia     Malignant melanoma of skin, unspecified     x3-4 occurrences    Mental disorder        Past Surgical History:   Procedure Laterality Date    BUNIONECTOMY Right 2019    right     CERVICAL BIOPSY  W/ LOOP ELECTRODE EXCISION  2010    COLPOSCOPY  06/26/2018    TONSILLECTOMY, ADENOIDECTOMY  2006       Family History   Problem Relation Age  of Onset    Other Mother         (-) for factor V Leiden    Fibroids Mother         pt suspects (uterine)    Thyroid nodules Mother     Lymphoma Paternal Aunt         (type?)    Colonic polyp Father     Breast cancer Paternal Aunt 50        uni/bilat?    Heart attack Paternal Grandfather     Lymphoma Paternal Grandmother         of the colon; dx 80s?    Cancer Maternal Grandfather 68        colon    Factor V Leiden deficiency Maternal Aunt     Thyroid cancer Maternal Grandmother 40    Colonic polyp Brother         pt believes    Thyroid cancer Maternal Aunt 58    Pulmonary embolism Maternal Cousin         2/2 factor V Leiden; traveled to brain    Factor V Leiden deficiency Maternal Cousin     Allergy (severe) Maternal Cousin         sulfa, almost  as a baby    Cancer Other         origin? (throat?)       Social History     Socioeconomic History    Marital status:    Tobacco Use    Smoking status: Never    Smokeless tobacco: Never   Substance and Sexual Activity    Alcohol use: Yes     Comment: social    Drug use: No    Sexual activity: Yes     Partners: Male     Birth control/protection: Partner-Vasectomy     Comment:        Review of patient's allergies indicates:   Allergen Reactions    Sulfa (sulfonamide antibiotics) Rash         Objective:   VS (24h):   Vitals:    23 1453   BP: 114/71   Pulse: 70   Temp: 98.4 °F (36.9 °C)     General: Afebrile, alert, comfortable, no acute distress.   HEENT: YEFRI. EOMI, no scleral icterus.   Pulmonary: Non labored  Extremities: Moves all extremities x 4.   Skin: No jaundice, rashes, or visible lesions.   Neurological:  Alert and oriented x 4.     Glucose   Date Value Ref Range Status   2010 73 70 - 110 mg/dl Final       Calcium   Date Value Ref Range Status   2010 9.8 8.7 - 10.5 mg/dl Final       Albumin   Date Value Ref Range Status   2010 4.0 3.5 - 5.2 g/dl Final       Total Protein   Date Value Ref Range Status   2010 7.0 6.0 -  8.4 gm/dl Final       Sodium   Date Value Ref Range Status   07/23/2010 143 136 - 145 mMol/l Final       Potassium   Date Value Ref Range Status   07/23/2010 4.4 3.5 - 5.1 mMol/l Final       CO2   Date Value Ref Range Status   07/23/2010 27 23.0 - 29.0 mEq/L Final       Chloride   Date Value Ref Range Status   07/23/2010 107 95 - 110 mMol/l Final       BUN   Date Value Ref Range Status   07/23/2010 11 6 - 20 mg/dl Final       Creatinine   Date Value Ref Range Status   07/23/2010 0.7 0.5 - 1.4 mg/dl Final       Alkaline Phosphatase   Date Value Ref Range Status   07/23/2010 64 55 - 135 U/L Final       ALT   Date Value Ref Range Status   07/23/2010 24 10 - 44 U/L Final       AST   Date Value Ref Range Status   07/23/2010 18 10 - 40 U/L Final       Total Bilirubin   Date Value Ref Range Status   07/23/2010 0.1 0.1 - 1.0 mg/dl Final     Comment:     For infants and newborns, interpretation of results should be based  on gestational age, weight and in agreement with clinical  observations.  .  Premature Infant recommended reference ranges:  Up to 24 hours.............<8.0 mg/dl  Up to 48 hours............<12.0 mg/dl  3-5 days..................<15.0 mg/dl  6-29 days.................<15.0 mg/dl         WBC   Date Value Ref Range Status   01/15/2021 7.0 3.8 - 10.8 Thousand/uL Final   02/27/2019 5.54 3.90 - 12.70 K/uL Final   07/23/2010 6.90 4.8 - 10.8 K/uL Final       Hemoglobin   Date Value Ref Range Status   01/15/2021 13.1 11.7 - 15.5 g/dL Final   02/27/2019 13.8 12.0 - 16.0 g/dL Final   07/23/2010 13.4 12.0 - 16.0 gm/dl Final       Hematocrit   Date Value Ref Range Status   01/15/2021 39.6 35.0 - 45.0 % Final   02/27/2019 41.6 37.0 - 48.5 % Final   07/23/2010 40.4 37.0 - 48.5 % Final       MCV   Date Value Ref Range Status   01/15/2021 93.8 80.0 - 100.0 fL Final   02/27/2019 94 82 - 98 fL Final   07/23/2010 90.4 82 - 95 fL Final       Platelets   Date Value Ref Range Status   01/15/2021 308 140 - 400 Thousand/uL Final    02/27/2019 362 (H) 150 - 350 K/uL Final   07/23/2010 300 150 - 350 K/uL Final       Lab Results   Component Value Date    CHOL 145 07/23/2010       Lab Results   Component Value Date    HDL 55 07/23/2010       Lab Results   Component Value Date    LDLCALC 67.2 07/23/2010       Lab Results   Component Value Date    TRIG 114 07/23/2010       Lab Results   Component Value Date    CHOLHDL 37.9 07/23/2010       HIV: No components found for: HIV 1/2 AG/AB  Hepatitis C IgG: No components found for: HEPATITIS C  Syphilis: No results found for: RPR    Hepatitis A IgG: No components found for: HEPATITIS A IGG  Hepatitis Bc IgG: No components found for: HEPATITIS B CORE IGG  Hepatitis Bs IgG:  Quantiferon: No results found for: QUANTIFERON        Assessment:     Pre-Travel clinic assessment  Vaccine counseling    Plan:     Patient specific risks:      The patient was provided with an extensive travel guidance packet which provides travel information specific to the patients itinerary.     The patient's medical history was reviewed and the patient was counseled on:    Precautions against development of DVT during flight.    Registering with the state department and travel insurance was recommended in case of emergency. Enroll in Smart Traveler Enrollment Program: https://step.state.gov/step/    Destination specific risks:      -Infectious Disease risks:      Mosquito Borne pathogens:  Reviewed basic mosquito avoidance precautions including wearing long sleeve clothing and insect repellant.  to prevent insect-borne diseases (e.g., malaria, dengue).The patient was also instructed to purchase insect repellent containing DEET (25-35%; higher strengths last longer, but >35% will damage synthetic materials) and apply premethrin spray on clothing according to repellent label instructions. Patient aware of risk of Zikavirus. The CDC recommends that couples should use condoms or not have sex for at least 3 months: after returning  "from an area with risk of Zika, even if they dont have symptoms    An anti-malarial agent was  prescribed for malaria prophylaxis and possible side effects were reviewed. Atovaquone-proguanil 250-100 mg PO qdaily, start 2 days before expected exposure and end 7 days after last day of exposure.    Food Borne pathogens:  Reviewed basic hand, food and water sanitation precautions.  Patient instructed to take hand  on their trip. The patient was instructed to purchase Imodium over the counter to take in case diarrhea (without blood or fever) develops. Azithromycin was ordered for treatment if severe or bloody diarrhea develops and the patient was instructed on use and possible side effects.     The "Simple Solution" - Home made Oral Rehydration Salts (ORS) Recipe:  Six (6) level teaspoons of Sugar.  Half (1/2) level teaspoon of Salt.  One Litre of clean drinking or boiled water and then cooled - 5 cupfuls (each cup about 200 ml.)    STDs:  Risk of STDs reviewed with the patient. Discussed Personal protective measures     Rabies: Discussed the risk of rabies and precautions to prevent exposure. Patient is aware to seek prompt medical care should they be exposed.       -Environmental risks:     Personal and travel safety. Precautions to minimize risk/exposure to crime and motor vehicle accidents were reviewed with the patient.    Precautions against sun exposure.      -Vaccinations:  The patient's immunization history was reviewed and, based on the patient's itinerary, the following immunizations were ordered:  - TDaP  - Typhoid IM x1    To view your immunizations given in [place], register for an account at: [email or web address]    The patient was encouraged to contact us about any problems that may develop after immunization and possible side effects were reviewed.    The patient was instructed to contact us if problems develop after travel.    > 30 min spent with patient and >50% of time spent counseling " about travel    Bere Fernandes MD, MPH  Infectious Disease

## 2023-07-14 NOTE — PROGRESS NOTES
Patient received 2 vaccines IM to the right deltoid, Typhoid anterior, and tdap posterior.  Tolerated well and left in NAD

## 2023-10-13 ENCOUNTER — OFFICE VISIT (OUTPATIENT)
Dept: OBSTETRICS AND GYNECOLOGY | Facility: CLINIC | Age: 43
End: 2023-10-13
Attending: OBSTETRICS & GYNECOLOGY
Payer: COMMERCIAL

## 2023-10-13 VITALS — HEIGHT: 68 IN | BODY MASS INDEX: 20.72 KG/M2 | WEIGHT: 136.69 LBS

## 2023-10-13 DIAGNOSIS — Z01.419 ENCOUNTER FOR GYNECOLOGICAL EXAMINATION (GENERAL) (ROUTINE) WITHOUT ABNORMAL FINDINGS: ICD-10-CM

## 2023-10-13 DIAGNOSIS — B37.9 YEAST INFECTION: ICD-10-CM

## 2023-10-13 DIAGNOSIS — N63.20 MASS OF LEFT BREAST, UNSPECIFIED QUADRANT: ICD-10-CM

## 2023-10-13 DIAGNOSIS — G47.00 INSOMNIA, UNSPECIFIED TYPE: ICD-10-CM

## 2023-10-13 DIAGNOSIS — R53.83 FATIGUE, UNSPECIFIED TYPE: Primary | ICD-10-CM

## 2023-10-13 PROCEDURE — 99396 PR PREVENTIVE VISIT,EST,40-64: ICD-10-PCS | Mod: S$GLB,,, | Performed by: OBSTETRICS & GYNECOLOGY

## 2023-10-13 PROCEDURE — 3008F BODY MASS INDEX DOCD: CPT | Mod: CPTII,S$GLB,, | Performed by: OBSTETRICS & GYNECOLOGY

## 2023-10-13 PROCEDURE — 3008F PR BODY MASS INDEX (BMI) DOCUMENTED: ICD-10-PCS | Mod: CPTII,S$GLB,, | Performed by: OBSTETRICS & GYNECOLOGY

## 2023-10-13 PROCEDURE — 1159F PR MEDICATION LIST DOCUMENTED IN MEDICAL RECORD: ICD-10-PCS | Mod: CPTII,S$GLB,, | Performed by: OBSTETRICS & GYNECOLOGY

## 2023-10-13 PROCEDURE — 99999 PR PBB SHADOW E&M-EST. PATIENT-LVL III: CPT | Mod: PBBFAC,,, | Performed by: OBSTETRICS & GYNECOLOGY

## 2023-10-13 PROCEDURE — 99396 PREV VISIT EST AGE 40-64: CPT | Mod: S$GLB,,, | Performed by: OBSTETRICS & GYNECOLOGY

## 2023-10-13 PROCEDURE — 99999 PR PBB SHADOW E&M-EST. PATIENT-LVL III: ICD-10-PCS | Mod: PBBFAC,,, | Performed by: OBSTETRICS & GYNECOLOGY

## 2023-10-13 PROCEDURE — 1159F MED LIST DOCD IN RCRD: CPT | Mod: CPTII,S$GLB,, | Performed by: OBSTETRICS & GYNECOLOGY

## 2023-10-13 RX ORDER — ZOLPIDEM TARTRATE 12.5 MG/1
TABLET, FILM COATED, EXTENDED RELEASE ORAL
COMMUNITY
Start: 2023-08-25

## 2023-10-13 RX ORDER — DAPSONE 75 MG/G
1 GEL TOPICAL 2 TIMES DAILY
COMMUNITY
Start: 2023-09-22

## 2023-10-13 RX ORDER — FLUCONAZOLE 150 MG/1
150 TABLET ORAL DAILY PRN
Qty: 5 TABLET | Refills: 0 | Status: SHIPPED | OUTPATIENT
Start: 2023-10-13 | End: 2023-10-18

## 2023-10-13 NOTE — PROGRESS NOTES
Subjective:       Patient ID: Natalia Lee is a 43 y.o. female.    Chief Complaint:  Annual Exam (Last pap/hpv 10-3-2022 negative, Last mammo 22 birads 1)        History of Present Illness  Natalia Lee is a 43 y.o. female  who presents for annual. Reports fatigue, low energy, insomnia. A friend is taking testosterone and feels great. Patient is considering it. Upon chart review she has discussed this with providers in the past and was referred to the [facility] for evaluation for testosterone. At that time decided against it but is now interested. The patient would like labs today to test for everything. Had large panel done by Dr. Castillo in the past and would like that done again. Will order today at [facility] per pt request. She knows to reach if she does not hear from me with results since they are faxed to me and not in the computer. Gets breast MRI alternating with MMG. Had a normal MRI in July of this year. Due for MMG at the end of the year. Periods are monthly. Asking if perimenopausal. I explained not likely but labs will show us.     Patient's last menstrual period was 2023 (exact date).   Date of Last Pap: 2020    Review of Systems  Review of Systems   Constitutional:  Negative for chills and fever.        Objective:   Physical Exam:   Constitutional: She is oriented to person, place, and time. Vital signs are normal. She appears well-developed and well-nourished. No distress.        Pulmonary/Chest: She exhibits no mass. Right breast exhibits no mass, no nipple discharge, no skin change, no tenderness, no bleeding and no swelling. Left breast exhibits mass. Left breast exhibits no nipple discharge, no skin change, no tenderness, no bleeding and no swelling. Breasts are symmetrical.   Well defined mass, firm, about the size of a sunflower seed fairly superficial. Patient had not felt this before            Abdominal: Soft. Bowel sounds are normal. She exhibits  no distension and no mass. There is no abdominal tenderness. There is no rebound.     Genitourinary:    Vagina and uterus normal.   There is no rash, tenderness, lesion or injury on the right labia. There is no rash, tenderness, lesion or injury on the left labia. Cervix is normal. Right adnexum displays no mass, no tenderness and no fullness. Left adnexum displays no mass, no tenderness and no fullness. No erythema,  no vaginal discharge, tenderness, rectocele, cystocele or unspecified prolapse of vaginal walls in the vagina. Cervix exhibits no motion tenderness, no discharge and no friability. Uterus is not deviated, not enlarged, not fixed, not tender and not hosting fibroids.           Musculoskeletal: Normal range of motion and moves all extremeties.      Lymphadenopathy:        Right: No supraclavicular adenopathy present.        Left: No supraclavicular adenopathy present.    Neurological: She is alert and oriented to person, place, and time.    Skin: Skin is warm and dry.    Psychiatric: She has a normal mood and affect. Her behavior is normal. Judgment normal.        Assessment/ Plan:     1. Fatigue, unspecified type  FOLLICLE STIMULATING HORMONE    Estradiol    TSH    T4, Free    T3, Free    CBC Auto Differential    Comprehensive Metabolic Panel    Hemoglobin A1C    Vitamin D    IRON AND TIBC    FERRITIN    FOLLICLE STIMULATING HORMONE    Estradiol    TSH    T4, Free    T3, Free    CBC Auto Differential    Comprehensive Metabolic Panel    Hemoglobin A1C    Vitamin D    IRON AND TIBC    FERRITIN      2. Insomnia, unspecified type  FOLLICLE STIMULATING HORMONE    Estradiol    TSH    T4, Free    T3, Free    CBC Auto Differential    Comprehensive Metabolic Panel    Hemoglobin A1C    Vitamin D    IRON AND TIBC    FERRITIN    FOLLICLE STIMULATING HORMONE    Estradiol    TSH    T4, Free    T3, Free    CBC Auto Differential    Comprehensive Metabolic Panel    Hemoglobin A1C    Vitamin D    IRON AND TIBC     FERRITIN      3. Mass of left breast, unspecified quadrant  Mammo Digital Diagnostic Bilat with Mario    US Breast Left Complete      4. Yeast infection  fluconazole (DIFLUCAN) 150 MG Tab      5. Encounter for gynecological examination (general) (routine) without abnormal findings        Will order breast imaging even though she just had a normal MRI due to palpable mass.   Requesting Rx for Diflucan in case she needs it  If labs abnormal or desires testosterone then will refer to [facility] again for eval.     No follow-ups on file.    As of April 1, 2021, the Cures Act has been passed nationally. This new law requires that all doctors progress notes, lab results, pathology reports and radiology reports be released IMMEDIATELY to the patient in the patient portal. That means that the results are released to you at the EXACT same time they are released to me. Therefore, with all of the patients that I have I am not able to reply to each patient exactly when the results come in. So there will be a delay from when you see the results to when I see them and have time to come up with a response to send you. Also I only see these results when I am on the computer at work. So if the results come in over the weekend or after 5 pm of a work day, I will not see them until the next business day. As you can tell, this is a challenge as a physician to give every patient the quick response they hope for and deserve. So please be patient! Thanks for understanding, Dr. Durán

## 2023-11-03 ENCOUNTER — HOSPITAL ENCOUNTER (OUTPATIENT)
Dept: RADIOLOGY | Facility: OTHER | Age: 43
Discharge: HOME | End: 2023-11-03
Attending: OBSTETRICS & GYNECOLOGY
Payer: COMMERCIAL

## 2023-11-03 DIAGNOSIS — N63.20 MASS OF LEFT BREAST, UNSPECIFIED QUADRANT: ICD-10-CM

## 2023-11-03 PROCEDURE — 77066 DX MAMMO INCL CAD BI: CPT | Mod: 26,,, | Performed by: RADIOLOGY

## 2023-11-03 PROCEDURE — 77062 BREAST TOMOSYNTHESIS BI: CPT | Mod: TC

## 2023-11-03 PROCEDURE — 77066 MAMMO DIGITAL DIAGNOSTIC BILAT WITH TOMO: ICD-10-PCS | Mod: 26,,, | Performed by: RADIOLOGY

## 2023-11-03 PROCEDURE — 76642 US BREAST LEFT LIMITED: ICD-10-PCS | Mod: 26,LT,, | Performed by: RADIOLOGY

## 2023-11-03 PROCEDURE — 76642 ULTRASOUND BREAST LIMITED: CPT | Mod: TC,LT

## 2023-11-03 PROCEDURE — 77062 BREAST TOMOSYNTHESIS BI: CPT | Mod: 26,,, | Performed by: RADIOLOGY

## 2023-11-03 PROCEDURE — 76642 ULTRASOUND BREAST LIMITED: CPT | Mod: 26,LT,, | Performed by: RADIOLOGY

## 2023-11-03 PROCEDURE — 77062 MAMMO DIGITAL DIAGNOSTIC BILAT WITH TOMO: ICD-10-PCS | Mod: 26,,, | Performed by: RADIOLOGY

## 2024-04-25 ENCOUNTER — TELEPHONE (OUTPATIENT)
Dept: OBSTETRICS AND GYNECOLOGY | Facility: CLINIC | Age: 44
End: 2024-04-25
Payer: COMMERCIAL

## 2024-04-25 NOTE — TELEPHONE ENCOUNTER
Luis Armando pt calling, states she found two lumps in her breast, would like to discuss #[phone]    4/25/24 @ 8105 .Returned pt's call. N/A [facility] @ [phone]. Will also send portal message.     Left message offering appointment tomorrow with Dr Sanchez at [facility] for 1:45. Spot on hold.

## 2024-04-26 ENCOUNTER — TELEPHONE (OUTPATIENT)
Dept: RADIOLOGY | Facility: HOSPITAL | Age: 44
End: 2024-04-26
Payer: COMMERCIAL

## 2024-04-26 ENCOUNTER — OFFICE VISIT (OUTPATIENT)
Dept: OBSTETRICS AND GYNECOLOGY | Facility: CLINIC | Age: 44
End: 2024-04-26
Attending: STUDENT IN AN ORGANIZED HEALTH CARE EDUCATION/TRAINING PROGRAM
Payer: COMMERCIAL

## 2024-04-26 VITALS
WEIGHT: 138.44 LBS | SYSTOLIC BLOOD PRESSURE: 120 MMHG | DIASTOLIC BLOOD PRESSURE: 88 MMHG | BODY MASS INDEX: 21.05 KG/M2

## 2024-04-26 DIAGNOSIS — N64.4 PAIN OF BOTH BREASTS: ICD-10-CM

## 2024-04-26 DIAGNOSIS — N63.0 BREAST LUMP IN UPPER OUTER QUADRANT: Primary | ICD-10-CM

## 2024-04-26 PROCEDURE — 1159F MED LIST DOCD IN RCRD: CPT | Mod: CPTII,S$GLB,, | Performed by: STUDENT IN AN ORGANIZED HEALTH CARE EDUCATION/TRAINING PROGRAM

## 2024-04-26 PROCEDURE — 3008F BODY MASS INDEX DOCD: CPT | Mod: CPTII,S$GLB,, | Performed by: STUDENT IN AN ORGANIZED HEALTH CARE EDUCATION/TRAINING PROGRAM

## 2024-04-26 PROCEDURE — 99213 OFFICE O/P EST LOW 20 MIN: CPT | Mod: S$GLB,,, | Performed by: STUDENT IN AN ORGANIZED HEALTH CARE EDUCATION/TRAINING PROGRAM

## 2024-04-26 PROCEDURE — 1160F RVW MEDS BY RX/DR IN RCRD: CPT | Mod: CPTII,S$GLB,, | Performed by: STUDENT IN AN ORGANIZED HEALTH CARE EDUCATION/TRAINING PROGRAM

## 2024-04-26 PROCEDURE — 99999 PR PBB SHADOW E&M-EST. PATIENT-LVL IV: CPT | Mod: PBBFAC,,, | Performed by: STUDENT IN AN ORGANIZED HEALTH CARE EDUCATION/TRAINING PROGRAM

## 2024-04-26 PROCEDURE — 3074F SYST BP LT 130 MM HG: CPT | Mod: CPTII,S$GLB,, | Performed by: STUDENT IN AN ORGANIZED HEALTH CARE EDUCATION/TRAINING PROGRAM

## 2024-04-26 PROCEDURE — 3079F DIAST BP 80-89 MM HG: CPT | Mod: CPTII,S$GLB,, | Performed by: STUDENT IN AN ORGANIZED HEALTH CARE EDUCATION/TRAINING PROGRAM

## 2024-04-26 RX ORDER — VALACYCLOVIR HYDROCHLORIDE 1 G/1
1000 TABLET, FILM COATED ORAL
COMMUNITY
Start: 2024-02-23

## 2024-04-26 NOTE — TELEPHONE ENCOUNTER
----- Message from Carolina Marshall MA sent at 4/26/2024  2:07 PM CDT -----  Regarding: Diag Bilateral Mammogram/Bilateral Breast US  Hi All,     This is Carolina messaging you regarding patient listed above.  Patient, Natalia Lee saw Dr. Maria Luz Sanchez OB/[facility]N [address] on 4/26/2024.  Chief Complaint: bilateral breast pains/lumps on both breasts.  Orders Placed At Visit: Bilateral Breast US Complete + Bilateral Diagnostic Mammogram with Mario.  Dr. Sanchez would like patient to get imaging performed within the next week or two, if possible.  Patient understood that she will be given a phone call to be scheduled.     Sincerely,    Carolina ALVAREZ  Obstetrics & Gynecology [address]  [facility]  [facility]  
[facility].     
Yes

## 2024-04-26 NOTE — PROGRESS NOTES
Chief Complaint: Breast Lumps     HPI:      Natalia Lee is a 43 y.o.  who presents complaining of breast lumps noticed after getting out the shower a few nights ago. Noticed new very small breast lumps in upper outer breast, one in each breast. Sometimes tender to palpation. No skin changes or nipple discharge.  Patient's last menstrual period was 2024 (exact date).     Previous Mammogram: BiRads 2 of left breast diagnostic imaging 2023 ; T-C of 28.9%    ROS:     GENERAL: Denies unintentional weight gain or weight loss. Feeling well overall.     Physical Exam:      PHYSICAL EXAM:  /88   Wt 62.8 kg (138 lb 7.2 oz)   LMP 2024 (Exact Date)   BMI 21.05 kg/m²   Body mass index is 21.05 kg/m².      APPEARANCE: Well nourished, well developed, in no acute distress.  BREASTS: Right: normal in size and symmetry, normal contour with no evidence of flattening or dimpling, skin normal, nipples everted without rashes or discharge, no palpable axillary lymphadenopathy, small approx 3 mm bee-bee sized lump in upper outer quadrant at 11 o'clock. Left: normal in size and symmetry, normal contour with no evidence of flattening or dimpling, skin normal, nipples everted without rashes or discharge, no palpable axillary lymphadenopathy, small approx 3 mm bee-bee sized lump in upper outer quadrant at 1 o'clock  ABDOMEN: Soft.  No tenderness or masses.      Physical Exam Chest:              Assessment/Plan:     Breast lump in upper outer quadrant  -     Cancel: Mammo Digital Diagnostic W THAIS W Contrast Bilateral; Future; Expected date: 2024  -     Mammo Digital Diagnostic Bilat with Thais; Future; Expected date: 2024    Pain of both breasts  -     US Breast Bilateral Complete; Future; Expected date: 2024      - bilateral lumps palpated in upper outer quadrants, diagnostic imaging ordered  - rtc when due for annual or sooner if needed

## 2024-05-01 ENCOUNTER — HOSPITAL ENCOUNTER (OUTPATIENT)
Dept: RADIOLOGY | Facility: HOSPITAL | Age: 44
Discharge: HOME | End: 2024-05-01
Attending: STUDENT IN AN ORGANIZED HEALTH CARE EDUCATION/TRAINING PROGRAM
Payer: COMMERCIAL

## 2024-05-01 DIAGNOSIS — N64.4 PAIN OF BOTH BREASTS: ICD-10-CM

## 2024-05-01 DIAGNOSIS — N63.0 BREAST LUMP IN UPPER OUTER QUADRANT: ICD-10-CM

## 2024-05-01 PROCEDURE — 76642 ULTRASOUND BREAST LIMITED: CPT | Mod: 26,,, | Performed by: RADIOLOGY

## 2024-05-01 PROCEDURE — 77062 BREAST TOMOSYNTHESIS BI: CPT | Mod: TC

## 2024-05-01 PROCEDURE — 77066 DX MAMMO INCL CAD BI: CPT | Mod: 26,,, | Performed by: RADIOLOGY

## 2024-05-01 PROCEDURE — 77062 BREAST TOMOSYNTHESIS BI: CPT | Mod: 26,,, | Performed by: RADIOLOGY

## 2024-05-01 PROCEDURE — 76642 ULTRASOUND BREAST LIMITED: CPT | Mod: TC,50

## 2024-05-03 ENCOUNTER — TELEPHONE (OUTPATIENT)
Dept: HEMATOLOGY/ONCOLOGY | Facility: CLINIC | Age: 44
End: 2024-05-03
Payer: COMMERCIAL

## 2024-05-03 DIAGNOSIS — Z91.89 AT HIGH RISK FOR BREAST CANCER: Primary | ICD-10-CM

## 2024-05-03 DIAGNOSIS — R92.30 DENSE BREAST TISSUE ON MAMMOGRAM, UNSPECIFIED TYPE: ICD-10-CM

## 2024-05-03 DIAGNOSIS — Z80.3 FAMILY HISTORY OF BREAST CANCER: ICD-10-CM

## 2024-05-03 NOTE — TELEPHONE ENCOUNTER
Called pt.   Appts set up per her request.     Appt slip to be mailed.       ----- Message from Eulalia Acosta sent at 5/3/2024  2:31 PM CDT -----  Regarding: Scheduling Request  Contact: :Natalia Lee  Scheduling Request           Appt Type:  mri mammo and ep     Date/Time Preference:any      Treating Provider:Ashley     Caller Name:Natalia Lee        Contact Preference:[phone]    Comments/notes:Patient is calling to get her appts schedule . Requesting a call back

## 2024-05-28 NOTE — PROGRESS NOTES
Subjective:      Patient ID: Natalia Lee is a 43 y.o. female.    Chief Complaint:Travel Consult      History of Present Illness    Travel Consult  Chief Complaint   Patient presents with    Travel Consult     Natalia Lee is here for travel consultation.  Traveling to [facility] on 7/28, returning 8/14.  Will be traveling with her .   Flying into [facility] and traveling to [facility].    will be hunting and she will accompany him on the truck or staying in the camp.  [place] [employer]   Have  to prepare meals.  She has made this trip many times.      [place]    Accommodations:  Hunting camp/safari camp.  [employer]   Purpose of travel: vacation  Currently ill / Fever: no  History of Splenectomy: no  The patient states that She does not live with a household member that has cancer, HIV infection, or take drugs to suppress the immune system.      Past Medical History:   Diagnosis Date    Abnormal Pap smear     Abnormal Pap smear of cervix     ADHD (attention deficit hyperactivity disorder)     Depression     Patient feels resolved as of 9/19/22    Deviated septum     Fibroids     Herpes simplex without mention of complication     oral as per pt    HPV (human papilloma virus) anogenital infection     Insomnia     Malignant melanoma of skin, unspecified     x3-4 occurrences    Mental disorder      Sulfa (sulfonamide antibiotics)  Immunization History   Administered Date(s) Administered    COVID-19, MRNA, LN-S, PF (Pfizer) (Purple Cap) 03/13/2021, 04/05/2021    Hepatitis A / Hepatitis B 06/18/2018, 07/18/2018, 12/18/2018    Influenza - Quadrivalent - PF *Preferred* (6 months and older) 10/17/2018, 10/14/2019, 08/21/2020, 09/27/2022    Tdap 07/19/2010, 06/02/2014, 07/14/2023    Typhoid - ViCPs 06/18/2018, 11/13/2020, 07/14/2023     Reports usual childhood vaccine     Review of Systems   Constitutional: Negative for chills, decreased appetite, fever, malaise/fatigue, night  sweats, weight gain and weight loss.   HENT:  Positive for congestion. Negative for ear pain, hearing loss, hoarse voice, sore throat and tinnitus.    Eyes:  Negative for blurred vision, redness and visual disturbance.   Cardiovascular:  Negative for chest pain, leg swelling and palpitations.   Respiratory:  Negative for cough, hemoptysis, shortness of breath, sputum production and wheezing.    Hematologic/Lymphatic: Negative for adenopathy. Does not bruise/bleed easily.   Skin:  Negative for dry skin, itching, rash and suspicious lesions.   Musculoskeletal:  Positive for back pain. Negative for joint pain, myalgias and neck pain.   Gastrointestinal:  Negative for abdominal pain, constipation, diarrhea, heartburn, nausea and vomiting.   Genitourinary:  Negative for dysuria, flank pain, frequency, hematuria, hesitancy and urgency.   Neurological:  Negative for dizziness, headaches, numbness, paresthesias and weakness.   Psychiatric/Behavioral:  Negative for depression and memory loss. The patient does not have insomnia and is not nervous/anxious.    Allergic/Immunologic: Negative for environmental allergies, HIV exposure, hives and persistent infections.     Objective:   Physical Exam  Constitutional:       General: She is not in acute distress.     Appearance: Normal appearance. She is not ill-appearing.   HENT:      Head: Normocephalic and atraumatic.   Eyes:      General: No scleral icterus.     Conjunctiva/sclera: Conjunctivae normal.   Cardiovascular:      Rate and Rhythm: Normal rate.   Pulmonary:      Effort: Pulmonary effort is normal. No respiratory distress.   Skin:     General: Skin is warm and dry.   Neurological:      Mental Status: She is alert and oriented to person, place, and time.   Psychiatric:         Mood and Affect: Mood normal.         Behavior: Behavior normal.       Assessment:     1. Counseling about travel    2. Vaccine counseling        Plan:     The Patient was provided with an extensive  travel guidance packet which provides travel information specific to the patients itinerary.   The patient's medical history was reviewed and the patient was counseled on:  Dietary precautions  Personal protective measures to prevent insect-borne diseases (e.g., malaria, dengue).  Precautions to prevent exposure to rabies and seek treatment for possible exposures.  Precautions against sun exposure.  Precautions against development of DVT during flight.  Personal and travel safety.  The patient's immunization history was reviewed and, based on the patient's itinerary, immunizations were recommended:  COVID, flu . Declined.   Typhoid up to date (received last year), Hep A, Hep B, Tdap up to date.  Will consider polio booster and let me know.   The patient was encouraged to contact us about any problems that may develop after immunization and possible side effects were reviewed.    The patient was instructed to purchase Imodium over the counter to take in case diarrhea (without blood or fever) develops.  An antibiotic was ordered for treatment if severe or bloody diarrhea develops and the patient was instructed on use and possible side effects.    The patient was also instructed to purchase insect repellent containing DEET or Picardin and apply according to repellent label instructions.  If indicated by the patients itinerary an anti-malarial agent was prescribed for malaria prophylaxis and possible side effects were reviewed.   Malarone prescribed  Tamiflu sent for prn use - transmission throughout the year.    The patient was instructed to contact us if problems develop after travel.        45 minutes of total time was spent on this encounter, which includes face to face time and non-face to face time preparing to see the patient (eg, review of tests), Obtaining and/or reviewing separately obtained history, documenting clinical information in the electronic or other health record, independently interpreting results  (not separately reported) and communicating results to the patient/family/caregiver, or care coordination (not separately reported).

## 2024-05-28 NOTE — PATIENT INSTRUCTIONS
Thank you for your visit today!   Please review travel guidance packet for your specific itinerary prior to your trip.      Vaccines today:  You are up to date on most of your vaccines.  You will let me know if you wish to consider the polio vaccine.  In addition to the malaria prevention pills, I will send in some tamiflu to have on hand as needed.       2.  General Insect/mosquito/tick precautions    Avoid insect bites by:  Using insect repellent with DEET or Picaridin.  You can apply insect repellents to clothing and skin (ideally containing 30-50% DEET).  See your Travel Packet.  Note that DEET strengths >35% can damage synthetic materials   Cover up as much as possible with loose-fitting, lightweight clothing.   Wear long sleeved shirts and long pants  Treat clothing and gear with permethrin 0.5%  Choose a lodging with air conditioning, or windows and door screens  Sleep under a mosquito net if you cannot stay in a place with air conditioning, or without screens and if you are staying outdoors    Prevent tick bites by:  Avoiding grassy or wooded areas or animals  Treat clothing and gear with permethrin 0.5%  Use EPA registered insect repellents  Avoid contact with ticks and  perform tick checks and remove them if found    3 Malaria prophylaxis:  - Malarone, take one pill daily.   Start 1-2 days prior to entering affected area, continue daily during travel, and continue for 7 days after leaving affected area      4.  Traveler's diarrhea  - buy hand  and use prior to eating and after using the bathroom  - bring imodium with you (available over the counter). If diarrhea is severe, lasting longer than 4 days or with associated fever or blood in stool, take the antibiotic,  Azithromycin, as we discussed.   -  Dehydration is the most likely complication of traveler's diarrhea, so it's important to try to stay well hydrated.  An oral rehydration salts (ORS) solution is the best way to replace lost fluids.  These solutions contain water and salts in specific proportions to replenish both fluids and electrolytes. They also contain glucose to enhance absorption in the intestinal tract.  Bottled oral rehydration products are available in drugstores in developed areas, and many pharmacies carry their own brands. You can find packets of powdered oral rehydration salts, labeled World Health Organization (WHO)-ORS, at stores, pharmacies and health agencies in most countries. Reconstitute the powder in bottled or boiled water according to the directions on the package.      If these products are unavailable, you can prepare your own rehydrating solution in an emergency by mixing together your own rehydration solution:  Six (6) level teaspoons of Sugar.  Half (1/2) level teaspoon of Salt.  One Litre of clean drinking or boiled water and then cooled - 5 cupfuls (each cup about 200 ml.)         5.    After you travel:  -  Ensure you finish any remaining medication such as antimalarial medications.   -  If you are not feeling well, please contact the ID office immediately to evaluate your symptoms.   -  You may be asked to remember any travel activities, where you stayed (hotel, family home, etc), what your ate and drank, any animal contacts, injuries or bug bites.

## 2024-05-29 ENCOUNTER — OFFICE VISIT (OUTPATIENT)
Dept: INFECTIOUS DISEASES | Facility: CLINIC | Age: 44
End: 2024-05-29

## 2024-05-29 VITALS
HEIGHT: 68 IN | WEIGHT: 137.81 LBS | TEMPERATURE: 98 F | SYSTOLIC BLOOD PRESSURE: 111 MMHG | BODY MASS INDEX: 20.89 KG/M2 | HEART RATE: 56 BPM | DIASTOLIC BLOOD PRESSURE: 74 MMHG

## 2024-05-29 DIAGNOSIS — Z71.84 COUNSELING ABOUT TRAVEL: Primary | ICD-10-CM

## 2024-05-29 DIAGNOSIS — Z29.89 NEED FOR MALARIA PROPHYLAXIS: ICD-10-CM

## 2024-05-29 DIAGNOSIS — A09 TRAVELER'S DIARRHEA: ICD-10-CM

## 2024-05-29 DIAGNOSIS — Z91.89 AT INCREASED RISK FOR EXPOSURE TO INFLUENZA VIRUS: ICD-10-CM

## 2024-05-29 DIAGNOSIS — Z71.85 VACCINE COUNSELING: ICD-10-CM

## 2024-05-29 PROCEDURE — 99403 PREV MED CNSL INDIV APPRX 45: CPT | Mod: S$GLB,,, | Performed by: NURSE PRACTITIONER

## 2024-05-29 PROCEDURE — 99999 PR PBB SHADOW E&M-EST. PATIENT-LVL IV: CPT | Mod: PBBFAC,,, | Performed by: NURSE PRACTITIONER

## 2024-06-01 RX ORDER — ATOVAQUONE AND PROGUANIL HYDROCHLORIDE 250; 100 MG/1; MG/1
TABLET, FILM COATED ORAL
Qty: 30 TABLET | Refills: 0 | Status: SHIPPED | OUTPATIENT
Start: 2024-06-01

## 2024-06-01 RX ORDER — AZITHROMYCIN 500 MG/1
500 TABLET, FILM COATED ORAL DAILY
Qty: 3 TABLET | Refills: 0 | Status: SHIPPED | OUTPATIENT
Start: 2024-06-01 | End: 2024-06-04

## 2024-06-01 RX ORDER — OSELTAMIVIR PHOSPHATE 75 MG/1
75 CAPSULE ORAL 2 TIMES DAILY
Qty: 10 CAPSULE | Refills: 0 | Status: SHIPPED | OUTPATIENT
Start: 2024-06-01 | End: 2024-06-06

## 2024-07-08 ENCOUNTER — OFFICE VISIT (OUTPATIENT)
Dept: HEMATOLOGY/ONCOLOGY | Facility: CLINIC | Age: 44
End: 2024-07-08
Payer: COMMERCIAL

## 2024-07-08 ENCOUNTER — HOSPITAL ENCOUNTER (OUTPATIENT)
Dept: RADIOLOGY | Facility: HOSPITAL | Age: 44
Discharge: HOME | End: 2024-07-08
Attending: NURSE PRACTITIONER
Payer: COMMERCIAL

## 2024-07-08 VITALS
OXYGEN SATURATION: 100 % | SYSTOLIC BLOOD PRESSURE: 118 MMHG | HEART RATE: 64 BPM | BODY MASS INDEX: 21.02 KG/M2 | DIASTOLIC BLOOD PRESSURE: 77 MMHG | TEMPERATURE: 98 F | WEIGHT: 138.69 LBS | RESPIRATION RATE: 17 BRPM | HEIGHT: 68 IN

## 2024-07-08 DIAGNOSIS — R92.30 DENSE BREAST TISSUE ON MAMMOGRAM, UNSPECIFIED TYPE: ICD-10-CM

## 2024-07-08 DIAGNOSIS — Z12.31 ENCOUNTER FOR SCREENING MAMMOGRAM FOR BREAST CANCER: ICD-10-CM

## 2024-07-08 DIAGNOSIS — Z91.89 AT HIGH RISK FOR BREAST CANCER: Primary | ICD-10-CM

## 2024-07-08 DIAGNOSIS — Z80.3 FAMILY HISTORY OF BREAST CANCER: ICD-10-CM

## 2024-07-08 DIAGNOSIS — Z15.89 GENE MUTATION: ICD-10-CM

## 2024-07-08 DIAGNOSIS — Z91.89 AT HIGH RISK FOR BREAST CANCER: ICD-10-CM

## 2024-07-08 PROCEDURE — 99999 PR PBB SHADOW E&M-EST. PATIENT-LVL IV: CPT | Mod: PBBFAC,,, | Performed by: NURSE PRACTITIONER

## 2024-07-08 PROCEDURE — 25500020 PHARM REV CODE 255: Performed by: NURSE PRACTITIONER

## 2024-07-08 PROCEDURE — 1159F MED LIST DOCD IN RCRD: CPT | Mod: CPTII,S$GLB,, | Performed by: NURSE PRACTITIONER

## 2024-07-08 PROCEDURE — 99214 OFFICE O/P EST MOD 30 MIN: CPT | Mod: S$GLB,,, | Performed by: NURSE PRACTITIONER

## 2024-07-08 PROCEDURE — 3008F BODY MASS INDEX DOCD: CPT | Mod: CPTII,S$GLB,, | Performed by: NURSE PRACTITIONER

## 2024-07-08 PROCEDURE — 77049 MRI BREAST C-+ W/CAD BI: CPT | Mod: 26,,, | Performed by: RADIOLOGY

## 2024-07-08 PROCEDURE — 77049 MRI BREAST C-+ W/CAD BI: CPT | Mod: TC

## 2024-07-08 PROCEDURE — A9577 INJ MULTIHANCE: HCPCS | Performed by: NURSE PRACTITIONER

## 2024-07-08 PROCEDURE — 3074F SYST BP LT 130 MM HG: CPT | Mod: CPTII,S$GLB,, | Performed by: NURSE PRACTITIONER

## 2024-07-08 PROCEDURE — 3078F DIAST BP <80 MM HG: CPT | Mod: CPTII,S$GLB,, | Performed by: NURSE PRACTITIONER

## 2024-07-08 PROCEDURE — G2211 COMPLEX E/M VISIT ADD ON: HCPCS | Mod: S$GLB,,, | Performed by: NURSE PRACTITIONER

## 2024-07-08 RX ADMIN — GADOBENATE DIMEGLUMINE 14 ML: 529 INJECTION, SOLUTION INTRAVENOUS at 02:07

## 2024-07-08 NOTE — PROGRESS NOTES
Chief Complaint   Patient presents with    At high risk for breast cancer         HPI:   Natalia Lee is a 43 y.o. who presents for follow up of increased risk of breast cancer.      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   RAD50 Mutation-Associated Risks  Autosomal-dominantly inherited:  While earlier studies had suggested that female RAD50 mutation carriers may have a predisposition to breast cancer, more recent data have not supported this.    Today, Feels good and no complaints.   No breast concerns.  She has tenderness around cycle.   2 small masses felt previous but had US - determined to be cysts.     5/1/2024 MMG/US:  Findings:  No breast composition recorded.   Mammo Digital Diagnostic Bilat with Mario  Bilateral  There is no evidence of suspicious masses, calcifications, or other abnormal findings.  US Breast Bilateral Limited  Bilateral  Targeted ultrasound was performed of the areas of palpable concern indicated by the patient in the upper-outer quadrants of each breast.  There is no evidence of suspicious masses or other abnormal findings in the areas of palpable concern.  An incidental simple cyst is noted in the 1:00 a.m. axis of the left breast measuring 5 mm and is benign.  Impression:  Bilateral  There is no sonographic evidence of malignancy.  BI-RADS Category:   Overall: 2 - Benign  Recommendation:  Routine screening mammogram in 1 year is recommended.  Of note, the decision to biopsy any palpable finding should be based on clinical assessment.   Your estimated lifetime risk of breast cancer (to age 85) based on Tyrer-Cuzick risk assessment model is 21.82%.  According to the American Cancer Society, patients with a lifetime breast cancer risk of 20% or higher might benefit from supplemental screening tests, such as screening breast MRI.    7/5/2023 MRI breast:  Findings:  The breasts have extreme amounts of fibroglandular tissue. The background parenchymal enhancement is marked and has increased  globally since the prior study.  No new significant masses, areas of abnormal enhancement, or other abnormal findings are seen. No axillary adenopathy is seen.   Impression:  No significant masses, areas of abnormal enhancement, or other abnormal findings are seen.  BI-RADS Category:   Overall: 1 - Negative  Recommendation:  Return to annual screening mammogram schedule is recommended with bilateral mammogram in 2023.          High Risk Breast cancer specific history:  - Age: 43 y.o.   - Height:  5'8  - Weight: 142 lbs >136 lbs >137lbs  - Breast density per BI-RADS:  d - Extremely dense  - Age at menarche:  11 yo  - Number of pregnancies: G0  - She is perimenopausal.   -Uterus and ovaries intact: Yes  - HRT: No but is on progesterone only for periods  - Genetic testing: No  - Personal history of cancer: Melanoma 2016   - Previous chest radiation exposure between ages 10-30 years old: No  - Personal history of breast biopsy: No  - Ashkenazi Spiritism Inheritance: No  - Family history of cancer:    Paternal aunt- breast cancer  59 yo  Paternal aunt- breast cancer  33 yo  Maternal grandfather- colon cancer  Paternal grandmother stomach cancer    Social History:  Tobacco use:  no  Alcohol use:  social  Exercise regimen: everyday- moving stairs and lifts weights  Employment: no      Past Medical   Past Medical History:   Diagnosis Date    Abnormal Pap smear     Abnormal Pap smear of cervix     ADHD (attention deficit hyperactivity disorder)     Depression     Patient feels resolved as of 21    Deviated septum     Fibroids     Herpes simplex without mention of complication     oral as per pt    HPV (human papilloma virus) anogenital infection     Insomnia     Malignant melanoma of skin, unspecified     x3-4 occurrences    Mental disorder      Patient Active Problem List   Diagnosis    Burning sensation of vulva    Pruritus of vulva    Possible Contact dermatitis    At high risk for breast cancer     Left hip pain    Pelvic floor tension    Stress incontinence    Nocturia more than twice per night    Hip joint pain    Gene mutation:  RAD50 [id number] heterozygous pathogenic    ADD (attention deficit disorder)    Anxiety    Raynaud's syndrome without gangrene    Seasonal allergies    Other insomnia     Social History   Social History     Tobacco Use    Smoking status: Never    Smokeless tobacco: Never   Substance Use Topics    Alcohol use: Yes     Comment: social    Drug use: No     Family History  Family History   Problem Relation Name Age of Onset    Heart attack Paternal Grandfather PGF     Lymphoma Paternal Grandmother PGM         of the colon; dx 80s?    Thyroid cancer Maternal Grandmother Yecenia 40    Cancer Maternal Grandfather Kirti 68        colon    Colonic polyp Father father     Other Mother mother         (-) for factor V Leiden    Fibroids Mother mother         pt suspects (uterine)    Thyroid nodules Mother mother     Colonic polyp Brother Jean-Paul         pt believes    Factor V Leiden deficiency Maternal Aunt Danielle     Thyroid cancer Maternal Aunt Indira 58    Lymphoma Paternal Aunt Eliana         (type?)    Breast cancer Paternal Aunt Nikky 50        uni/bilat?    Pulmonary embolism Maternal Cousin Leandro         2/2 factor V Leiden; traveled to brain    Factor V Leiden deficiency Maternal Cousin Leandro     Allergy (severe) Maternal Cousin Stacey lord, almost  as a baby    Cancer Other [id number]         origin? (throat?)     Medications    Current Outpatient Medications:     atovaquone-proguaniL (MALARONE) 250-100 mg Tab, Take one tablet daily for malaria prevention. Begin one day before entering malarious area and continue for 1 week after return., Disp: 30 tablet, Rfl: 0    dapsone (ACZONE) 7.5 % GlwP, Take 1 application  by mouth 2 (two) times daily. Apply to affected area, Disp: , Rfl:     dextroamphetamine-amphetamine 30 mg Tab, Take 1 tablet by mouth 2 (two) times daily., Disp: ,  "Rfl: 0    PROGESTERONE MISC, Apply topically. Every night, on wrist, Disp: , Rfl:     RETIN-A MICRO PUMP 0.06 % GlwP, , Disp: , Rfl:     TAZORAC 0.05 % Crea cream, 1 application every evening., Disp: , Rfl:     triamcinolone acetonide 0.1% (KENALOG) 0.1 % ointment, Apply pea-sized amount to affected areas twice daily (mix with pea-sized amount of Nystatin ointment before applying)., Disp: 30 g, Rfl: 0    zolpidem (AMBIEN CR) 12.5 MG CR tablet, , Disp: , Rfl:     clindamycin (CLEOCIN T) 1 % Swab, Apply 1 each topically 2 (two) times daily. (Patient not taking: Reported on 5/29/2024), Disp: , Rfl:     eszopiclone (LUNESTA) 3 mg Tab, TAKE TWO TABLET BY MOUTH AT BEDTIME AS NEEDED FOR sleep (Patient not taking: Reported on 5/29/2024), Disp: , Rfl:     ivermectin (SOOLANTRA) 1 % Crea, Apply topically. (Patient not taking: Reported on 5/29/2024), Disp: , Rfl:     nystatin (MYCOSTATIN) ointment, Apply pea-sized amount to affected areas twice daily (mix with pea-sized amount of Triamcinolone ointment before applying). (Patient not taking: Reported on 5/29/2024), Disp: 30 g, Rfl: 0    valACYclovir (VALTREX) 1000 MG tablet, Take 1,000 mg by mouth. (Patient not taking: Reported on 5/29/2024), Disp: , Rfl:     valACYclovir (VALTREX) 500 MG tablet, Take 1 tablet (500 mg total) by mouth 2 (two) times daily for 5-7 days as needed (Patient not taking: Reported on 5/29/2024), Disp: , Rfl:   Allergies  Review of patient's allergies indicates:   Allergen Reactions    Sulfa (sulfonamide antibiotics) Rash       Review of Systems       See above   All other systems reviewed and are negative.    Objective:      Vitals:   Vitals:    07/08/24 1327   BP: 118/77   Pulse: 64   Resp: 17   Temp: 98 °F (36.7 °C)   TempSrc: Oral   SpO2: 100%   Weight: 62.9 kg (138 lb 10.7 oz)   Height: 5' 8" (1.727 m)       BMI: Body mass index is 21.08 kg/m².   Body surface area is 1.74 meters squared.    Physical Exam  Vitals signs reviewed.   Constitutional:  " Normal appearance. NAD.   HENT: Normocephalic.   Eyes: Pupils are equal, round, and reactive to light.   Cardiovascular: Normal rate.   Pulmonary: Pulmonary effort is normal.   Musculoskeletal: Normal range of motion.   Lymphadenopathy: No cervical adenopathy. No axillary adenopathy.   Skin: Skin is warm and dry.   Neurological:  She is alert and oriented to person, place, and time.   Psychiatric: Mood normal.     Breast Exam:  +dense breasts;difficult to assess. Upper outer quadrants with hard dense tissue - similar on both sides but left >right. Small cystic mass LUOQ      Laboratory Data: reviewed most recent   Imaging: reviewed most recent        Assessment:     1. At high risk for breast cancer    2. Family history of breast cancer    3. Dense breast tissue on mammogram, unspecified type    4. Gene mutation    5. Encounter for screening mammogram for breast cancer            Plan:     Clinically negative  Continue high risk screening.   MRI breast today as scheduled.   MMG due 5/2/2025  2 CBE annually- one per GYN an one here.  Lifestyle modifications as previously discussed.   Encourage breast awareness, including monthly breast self-exams.     I will see in 1 year ot set next MRI as may push out a little farther from MMg.     Route Chart for Scheduling    Med Onc Chart Routing      Follow up with physician    Follow up with [facility] 1 year. for breast exam and to set next MRI   Infusion scheduling note    Injection scheduling note    Labs    Imaging   MMG due 5/2025;   Pharmacy appointment    Other referrals                     Questions were encouraged and answered to patient's satisfaction, and patient verbalized understanding of information and agreement with the plan. Advised patient to RTC with any interval changes or concerns.      Patient is in agreement with the proposed treatment plan. All questions were answered to the patient's satisfaction. Pt knows to call clinic for any new or worsening symptoms and  if anything is needed before the next clinic visit.    Siri Rowan, MSN, APRN, FNP-C  Nurse Practitioner to Dr. Ela Mak  [facility]  [facility]  Hematology & Medical Oncology  [address] [place] [id number]  ph. [phone] ext [phone]  Fax. [phone]              30 minutes of total time spent on the encounter, which includes face to face time and non-face to face time preparing to see the patient (eg, review of tests), Obtaining and/or reviewing separately obtained history, Documenting clinical information in the electronic or other health record, Independently interpreting results (not separately reported) and communicating results to the patient/family/caregiver, or Care coordination (not separately reported).

## 2024-07-15 ENCOUNTER — PATIENT MESSAGE (OUTPATIENT)
Dept: INFECTIOUS DISEASES | Facility: HOSPITAL | Age: 44
End: 2024-07-15
Payer: COMMERCIAL

## 2024-11-11 ENCOUNTER — PATIENT MESSAGE (OUTPATIENT)
Dept: HEMATOLOGY/ONCOLOGY | Facility: CLINIC | Age: 44
End: 2024-11-11
Payer: COMMERCIAL

## 2024-11-13 ENCOUNTER — HOSPITAL ENCOUNTER (OUTPATIENT)
Dept: RADIOLOGY | Facility: OTHER | Age: 44
Discharge: HOME | End: 2024-11-13
Attending: NURSE PRACTITIONER
Payer: COMMERCIAL

## 2024-11-13 DIAGNOSIS — Z12.31 ENCOUNTER FOR SCREENING MAMMOGRAM FOR BREAST CANCER: ICD-10-CM

## 2024-11-13 DIAGNOSIS — Z15.89 GENE MUTATION: ICD-10-CM

## 2024-11-13 DIAGNOSIS — Z91.89 AT HIGH RISK FOR BREAST CANCER: ICD-10-CM

## 2024-11-13 DIAGNOSIS — Z80.3 FAMILY HISTORY OF BREAST CANCER: ICD-10-CM

## 2024-11-13 DIAGNOSIS — R92.30 DENSE BREAST TISSUE ON MAMMOGRAM, UNSPECIFIED TYPE: ICD-10-CM

## 2024-11-13 PROCEDURE — 77063 BREAST TOMOSYNTHESIS BI: CPT | Mod: TC

## 2025-01-27 DIAGNOSIS — G47.9 TROUBLE IN SLEEPING: ICD-10-CM

## 2025-01-27 DIAGNOSIS — R06.83 SNORING: Primary | ICD-10-CM

## 2025-03-07 ENCOUNTER — OFFICE VISIT (OUTPATIENT)
Dept: URGENT CARE | Facility: CLINIC | Age: 45
End: 2025-03-07
Payer: COMMERCIAL

## 2025-03-07 VITALS
OXYGEN SATURATION: 99 % | DIASTOLIC BLOOD PRESSURE: 79 MMHG | BODY MASS INDEX: 21.39 KG/M2 | SYSTOLIC BLOOD PRESSURE: 131 MMHG | HEIGHT: 68 IN | TEMPERATURE: 98 F | WEIGHT: 141.13 LBS | RESPIRATION RATE: 19 BRPM | HEART RATE: 85 BPM

## 2025-03-07 DIAGNOSIS — R05.9 COUGH IN ADULT: ICD-10-CM

## 2025-03-07 DIAGNOSIS — J06.9 VIRAL URI: Primary | ICD-10-CM

## 2025-03-07 LAB
CTP QC/QA: YES
MOLECULAR STREP A: NEGATIVE

## 2025-03-07 PROCEDURE — 71046 X-RAY EXAM CHEST 2 VIEWS: CPT | Mod: S$GLB,,, | Performed by: RADIOLOGY

## 2025-03-07 RX ORDER — HYDROCORTISONE 10 MG/1
10 TABLET ORAL
COMMUNITY
Start: 2025-03-05

## 2025-03-07 RX ORDER — ZOLPIDEM TARTRATE 5 MG/1
TABLET ORAL
COMMUNITY

## 2025-03-07 RX ORDER — HYDROCORTISONE 5 MG/1
TABLET ORAL
COMMUNITY
Start: 2025-03-04

## 2025-03-07 RX ORDER — PREDNISONE 10 MG/1
10 TABLET ORAL DAILY
Qty: 2 TABLET | Refills: 0 | Status: SHIPPED | OUTPATIENT
Start: 2025-03-07 | End: 2025-03-09

## 2025-03-07 RX ORDER — DEXTROAMPHETAMINE SACCHARATE, AMPHETAMINE ASPARTATE, DEXTROAMPHETAMINE SULFATE AND AMPHETAMINE SULFATE 7.5; 7.5; 7.5; 7.5 MG/1; MG/1; MG/1; MG/1
TABLET ORAL
COMMUNITY

## 2025-03-07 RX ORDER — TRIAMCINOLONE ACETONIDE 1 MG/G
CREAM TOPICAL
COMMUNITY
Start: 2025-02-24

## 2025-03-07 RX ORDER — PROMETHAZINE HYDROCHLORIDE AND DEXTROMETHORPHAN HYDROBROMIDE 6.25; 15 MG/5ML; MG/5ML
5 SYRUP ORAL EVERY 4 HOURS PRN
Qty: 180 ML | Refills: 0 | Status: SHIPPED | OUTPATIENT
Start: 2025-03-07 | End: 2025-03-17

## 2025-03-07 RX ORDER — PROGESTERONE 100 MG/1
CAPSULE ORAL
COMMUNITY

## 2025-03-07 RX ORDER — ZOLPIDEM TARTRATE 12.5 MG/1
1 TABLET, FILM COATED, EXTENDED RELEASE ORAL NIGHTLY PRN
COMMUNITY

## 2025-03-07 RX ORDER — LEVOTHYROXINE, LIOTHYRONINE 19; 4.5 UG/1; UG/1
30 TABLET ORAL EVERY MORNING
COMMUNITY
Start: 2025-02-24

## 2025-03-07 NOTE — PATIENT INSTRUCTIONS
"Cough in adult  -     POCT Strep A, Molecular    -     X-Ray Chest PA And Lateral      -     predniSONE (DELTASONE) 10 MG tablet; Take 1 tablet (10 mg total) by mouth once daily. for 2 days  Dispense: 2 tablet; Refill: 0      Viral URI (upper respiratory infection):    Your symptoms are viral in nature.  Viral upper respiratory infections typically run their course in 7-10 days.     - Rest at home.     - Drink plenty of fluids so you won't get dehydrated.      Avoid taking Decongestants such as pseudoephedrine (ex. Sudafed) or phenylephrine (ex. Mucinex FastMax, Dayquil, Nyquil, or any combo cold meds that say "cold," "sinus" or "-D").        - Cough recommendations:   Warm tea with honey can help with cough. Honey is a natural cough suppressant.     NIGHTTIME:  -     (PROMETHAZINE-DM) promethazine-dextromethorphan 6.25-15 mg/5 mL Syrp; Take 5 mLs by mouth every 4 (four) hours as needed (cough).    +  Mucinex (guaifenesin) twice a day (or as directed) to help loosen mucous.       OR    DAYTIME:   Mucinex DM (guaifenesin + dextromethorphan).        - Fever/Pain recommendations:  Alternate Tylenol or Ibuprofen as directed for fever/pain.   - Motrin/Ibuprofen every 6-8 hours for pain and inflammation. Do not take ibuprofen if you have a history of GI bleeding, kidney disease, or if you take blood thinners.    - Tylenol/acetaminophen every 6-8 hours for added pain relief.  Avoid tylenol if you have a history of liver disease.       -Sore throat recommendations: Warm fluids, warm salt water gargles, throat lozenges, tea, honey, soup, or drinking something cold or frozen.  Throat lozenges or sprays help reduce pain. Gargling with warm saltwater (1/4 teaspoon of salt in 1/2 cup of warm water) or an OTC anesthetic gargle may be useful for irritation.    When to seek medical advice  Call your healthcare provider right away if any of these occur:  Fever that is poorly controlled with OTC fever reducing medication  New or " worsening ear pain, sinus pain, or headache  Stiff neck  You can't swallow liquids or you can't open your mouth wide because of throat pain  Signs of dehydration. These include very dark urine or no urine, sunken eyes, and dizziness.  Trouble breathing or noisy breathing  Muffled voice  Rash     If your symptoms worsen or fail to improve you should go to Emergency Department.

## 2025-03-07 NOTE — PROGRESS NOTES
"Subjective:      Patient ID: Natalia Lee is a 44 y.o. female.    Vitals:  height is 5' 8" (1.727 m) and weight is 64 kg (141 lb 1.5 oz). Her oral temperature is 98.2 °F (36.8 °C). Her blood pressure is 131/79 and her pulse is 85. Her respiration is 19 and oxygen saturation is 99%.     Chief Complaint: Cough    Pt is a 43 yo female presenting with c/o cough, PND, congestion, sore throat, ear fullness. she also expresses she feels the congestion is moving to her chest.    Cough  This is a new problem. The current episode started in the past 7 days (5 days). The problem has been unchanged. Associated symptoms include ear congestion, ear pain ("fullness"), nasal congestion, postnasal drip and a sore throat. Pertinent negatives include no chest pain, chills, fever, headaches, myalgias or shortness of breath. Treatments tried: alkeszter cold & flu.       Constitution: Negative for chills, fatigue and fever.   HENT:  Positive for ear pain ("fullness"), congestion, postnasal drip and sore throat. Negative for sinus pain.    Cardiovascular:  Negative for chest pain.   Respiratory:  Positive for chest tightness and cough. Negative for sputum production and shortness of breath.    Gastrointestinal:  Negative for nausea, vomiting and diarrhea.   Musculoskeletal:  Negative for muscle ache.   Neurological:  Negative for headaches.      Objective:     Physical Exam   Constitutional: She is oriented to person, place, and time.   HENT:   Head: Normocephalic.   Ears:   Right Ear: Hearing and external ear normal. No no drainage, swelling or tenderness. Tympanic membrane is not erythematous, not retracted and not bulging. No middle ear effusion.   Left Ear: Hearing and external ear normal. No no drainage, swelling or tenderness. Tympanic membrane is not erythematous, not retracted and not bulging.  No middle ear effusion.   Nose: Nose normal. No mucosal edema, rhinorrhea or purulent discharge. Right sinus exhibits no maxillary " sinus tenderness and no frontal sinus tenderness. Left sinus exhibits no maxillary sinus tenderness and no frontal sinus tenderness.   Mouth/Throat: Uvula is midline, oropharynx is clear and moist and mucous membranes are normal. No uvula swelling. No oropharyngeal exudate, posterior oropharyngeal edema or posterior oropharyngeal erythema.   Cardiovascular: Normal rate and regular rhythm.   Pulmonary/Chest: Effort normal and breath sounds normal. No accessory muscle usage or stridor. No respiratory distress. She has no decreased breath sounds. She has no wheezes. She has no rhonchi. She has no rales.   Neurological: She is alert and oriented to person, place, and time.   Skin: Skin is warm and dry.   Nursing note and vitals reviewed.    X-Ray Chest PA And Lateral  Result Date: 3/7/2025  EXAMINATION: XR CHEST PA AND LATERAL CLINICAL HISTORY: Cough, unspecified TECHNIQUE: PA and lateral views of the chest were performed. COMPARISON: None FINDINGS: Heart size normal.  The lungs are clear.  No pleural effusion     See above Electronically signed by: Vernon Chacon MD Date:    03/07/2025 Time:    10:47        Assessment:     1. Viral URI    2. Cough in adult        Plan:       Viral URI    Cough in adult  -     POCT Strep A, Molecular  -     X-Ray Chest PA And Lateral; Future; Expected date: 03/07/2025  -     promethazine-dextromethorphan (PROMETHAZINE-DM) 6.25-15 mg/5 mL Syrp; Take 5 mLs by mouth every 4 (four) hours as needed (cough).  Dispense: 180 mL; Refill: 0  -     predniSONE (DELTASONE) 10 MG tablet; Take 1 tablet (10 mg total) by mouth once daily. for 2 days  Dispense: 2 tablet; Refill: 0      Patient Instructions   Cough in adult  -     POCT Strep A, Molecular    -     X-Ray Chest PA And Lateral      -     predniSONE (DELTASONE) 10 MG tablet; Take 1 tablet (10 mg total) by mouth once daily. for 2 days  Dispense: 2 tablet; Refill: 0      Viral URI (upper respiratory infection):    Your symptoms are viral in  "nature.  Viral upper respiratory infections typically run their course in 7-10 days.     - Rest at home.     - Drink plenty of fluids so you won't get dehydrated.      Avoid taking Decongestants such as pseudoephedrine (ex. Sudafed) or phenylephrine (ex. Mucinex FastMax, Dayquil, Nyquil, or any combo cold meds that say "cold," "sinus" or "-D").        - Cough recommendations:   Warm tea with honey can help with cough. Honey is a natural cough suppressant.     NIGHTTIME:  -     (PROMETHAZINE-DM) promethazine-dextromethorphan 6.25-15 mg/5 mL Syrp; Take 5 mLs by mouth every 4 (four) hours as needed (cough).    +  Mucinex (guaifenesin) twice a day (or as directed) to help loosen mucous.       OR    DAYTIME:   Mucinex DM (guaifenesin + dextromethorphan).        - Fever/Pain recommendations:  Alternate Tylenol or Ibuprofen as directed for fever/pain.   - Motrin/Ibuprofen every 6-8 hours for pain and inflammation. Do not take ibuprofen if you have a history of GI bleeding, kidney disease, or if you take blood thinners.    - Tylenol/acetaminophen every 6-8 hours for added pain relief.  Avoid tylenol if you have a history of liver disease.       -Sore throat recommendations: Warm fluids, warm salt water gargles, throat lozenges, tea, honey, soup, or drinking something cold or frozen.  Throat lozenges or sprays help reduce pain. Gargling with warm saltwater (1/4 teaspoon of salt in 1/2 cup of warm water) or an OTC anesthetic gargle may be useful for irritation.    When to seek medical advice  Call your healthcare provider right away if any of these occur:  Fever that is poorly controlled with OTC fever reducing medication  New or worsening ear pain, sinus pain, or headache  Stiff neck  You can't swallow liquids or you can't open your mouth wide because of throat pain  Signs of dehydration. These include very dark urine or no urine, sunken eyes, and dizziness.  Trouble breathing or noisy breathing  Muffled voice  Rash     If " your symptoms worsen or fail to improve you should go to Emergency Department.

## 2025-03-08 ENCOUNTER — TELEPHONE (OUTPATIENT)
Dept: URGENT CARE | Facility: CLINIC | Age: 45
End: 2025-03-08
Payer: COMMERCIAL

## 2025-03-09 ENCOUNTER — TELEPHONE (OUTPATIENT)
Dept: URGENT CARE | Facility: CLINIC | Age: 45
End: 2025-03-09
Payer: COMMERCIAL

## 2025-05-02 ENCOUNTER — TELEPHONE (OUTPATIENT)
Dept: HEMATOLOGY/ONCOLOGY | Facility: CLINIC | Age: 45
End: 2025-05-02
Payer: COMMERCIAL

## 2025-05-02 DIAGNOSIS — R92.30 DENSE BREAST TISSUE ON MAMMOGRAM, UNSPECIFIED TYPE: ICD-10-CM

## 2025-05-02 DIAGNOSIS — Z91.89 AT HIGH RISK FOR BREAST CANCER: Primary | ICD-10-CM

## 2025-05-02 DIAGNOSIS — Z80.3 FAMILY HISTORY OF BREAST CANCER: ICD-10-CM

## 2025-05-02 DIAGNOSIS — Z12.39 BREAST CANCER SCREENING, HIGH RISK PATIENT: ICD-10-CM

## 2025-05-02 DIAGNOSIS — Z00.00 PREVENTATIVE HEALTH CARE: ICD-10-CM

## 2025-05-02 NOTE — TELEPHONE ENCOUNTER
I called the pt from her portal message about getting scheduled for a Breast MRI and a follow up appt. No answer. I left a message with my direct number for her to call me back.

## 2025-07-02 DIAGNOSIS — N63.0 MASS OF BREAST, UNSPECIFIED LATERALITY: Primary | ICD-10-CM

## 2025-07-07 ENCOUNTER — TELEPHONE (OUTPATIENT)
Dept: HEMATOLOGY/ONCOLOGY | Facility: CLINIC | Age: 45
End: 2025-07-07
Payer: COMMERCIAL

## 2025-07-09 ENCOUNTER — HOSPITAL ENCOUNTER (OUTPATIENT)
Dept: RADIOLOGY | Facility: HOSPITAL | Age: 45
Discharge: HOME | End: 2025-07-09
Attending: NURSE PRACTITIONER
Payer: COMMERCIAL

## 2025-07-09 DIAGNOSIS — Z80.3 FAMILY HISTORY OF BREAST CANCER: ICD-10-CM

## 2025-07-09 DIAGNOSIS — R92.30 DENSE BREAST TISSUE ON MAMMOGRAM, UNSPECIFIED TYPE: ICD-10-CM

## 2025-07-09 DIAGNOSIS — Z91.89 AT HIGH RISK FOR BREAST CANCER: ICD-10-CM

## 2025-07-09 DIAGNOSIS — Z12.39 BREAST CANCER SCREENING, HIGH RISK PATIENT: ICD-10-CM

## 2025-07-09 DIAGNOSIS — Z00.00 PREVENTATIVE HEALTH CARE: ICD-10-CM

## 2025-07-09 PROCEDURE — A9577 INJ MULTIHANCE: HCPCS | Performed by: NURSE PRACTITIONER

## 2025-07-09 PROCEDURE — 77049 MRI BREAST C-+ W/CAD BI: CPT | Mod: 26,,, | Performed by: RADIOLOGY

## 2025-07-09 PROCEDURE — 77049 MRI BREAST C-+ W/CAD BI: CPT | Mod: TC

## 2025-07-09 PROCEDURE — 25500020 PHARM REV CODE 255: Performed by: NURSE PRACTITIONER

## 2025-07-09 RX ADMIN — GADOBENATE DIMEGLUMINE 14 ML: 529 INJECTION, SOLUTION INTRAVENOUS at 03:07

## 2025-07-11 ENCOUNTER — OFFICE VISIT (OUTPATIENT)
Dept: HEMATOLOGY/ONCOLOGY | Facility: CLINIC | Age: 45
End: 2025-07-11
Payer: COMMERCIAL

## 2025-07-11 DIAGNOSIS — Z12.31 ENCOUNTER FOR SCREENING MAMMOGRAM FOR BREAST CANCER: ICD-10-CM

## 2025-07-11 DIAGNOSIS — Z91.89 AT HIGH RISK FOR BREAST CANCER: ICD-10-CM

## 2025-07-11 DIAGNOSIS — Z12.39 BREAST CANCER SCREENING, HIGH RISK PATIENT: ICD-10-CM

## 2025-07-11 DIAGNOSIS — N63.21 MASS OF UPPER OUTER QUADRANT OF LEFT BREAST: Primary | ICD-10-CM

## 2025-07-11 DIAGNOSIS — Z80.3 FAMILY HISTORY OF BREAST CANCER: ICD-10-CM

## 2025-07-11 DIAGNOSIS — R92.30 DENSE BREAST TISSUE ON MAMMOGRAM, UNSPECIFIED TYPE: ICD-10-CM

## 2025-07-11 NOTE — PROGRESS NOTES
The patient location is: [place]  The chief complaint leading to consultation is: high risk breast     Visit type: audiovisual      30 minutes of total time spent on the encounter, which includes face to face time and non-face to face time preparing to see the patient (eg, review of tests), Obtaining and/or reviewing separately obtained history, Documenting clinical information in the electronic or other health record, Independently interpreting results (not separately reported) and communicating results to the patient/family/caregiver, or Care coordination (not separately reported).         Each patient to whom he or she provides medical services by telemedicine is:  (1) informed of the relationship between the physician and patient and the respective role of any other health care provider with respect to management of the patient; and (2) notified that he or she may decline to receive medical services by telemedicine and may withdraw from such care at any time.    Notes:       HPI:   Natalia Lee is a 44 y.o. who presents for follow up of increased risk of breast cancer.  Felt lump in left breast last month  - may be cyclical as was about to start cycle. She still feels it today. Mass located in upper outer quadrant of left breast. She is moving and asked to switch her appt to virtual.  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   RAD50 Mutation-Associated Risks  Autosomal-dominantly inherited:  While earlier studies had suggested that female RAD50 mutation carriers may have a predisposition to breast cancer, more recent data have not supported this.      Breast imaging reviewed.     High Risk Breast cancer specific history:  - Age: 44 y.o.   - Height:  5'8  - Breast density per BI-RADS:  d - Extremely dense  - Age at menarche:  11 yo  - Number of pregnancies: G0  - She is perimenopausal.   -Uterus and ovaries intact: Yes  - HRT: No but is on progesterone only for periods  - Genetic testing: No  -  Personal history of cancer: Melanoma 2016   - Previous chest radiation exposure between ages 10-30 years old: No  - Personal history of breast biopsy: No  - Ashkenazi [employer] Inheritance: No  - Family history of cancer:    Paternal aunt- breast cancer  59 yo  Paternal aunt- breast cancer  35 yo  Maternal grandfather- colon cancer  Paternal grandmother stomach cancer    Social History:  Tobacco use:  no  Alcohol use:  social  Exercise regimen: everyday- moving stairs and lifts weights  Employment: no      Past Medical   Past Medical History:   Diagnosis Date    Abnormal Pap smear     Abnormal Pap smear of cervix     ADHD (attention deficit hyperactivity disorder)     Depression     Patient feels resolved as of 21    Deviated septum     Fibroids     Herpes simplex without mention of complication     oral as per pt    HPV (human papilloma virus) anogenital infection     Insomnia     Malignant melanoma of skin, unspecified     x3-4 occurrences    Mental disorder      Patient Active Problem List   Diagnosis    Burning sensation of vulva    Pruritus of vulva    Possible Contact dermatitis    At high risk for breast cancer    Left hip pain    Pelvic floor tension    Stress incontinence    Nocturia more than twice per night    Hip joint pain    Gene mutation:  RAD50 [id number] heterozygous pathogenic    ADD (attention deficit disorder)    Anxiety    Raynaud's syndrome without gangrene    Seasonal allergies    Other insomnia     Social History   Social History     Tobacco Use    Smoking status: Never    Smokeless tobacco: Never   Substance Use Topics    Alcohol use: Yes     Comment: social    Drug use: No     Family History  Family History   Problem Relation Name Age of Onset    Heart attack Paternal Grandfather PGF     Lymphoma Paternal Grandmother PGM         of the colon; dx 80s?    Thyroid cancer Maternal Grandmother Yecenia 40    Cancer Maternal Grandfather Kirti 68        colon    Colonic polyp  Father father     Other Mother mother         (-) for factor V Leiden    Fibroids Mother mother         pt suspects (uterine)    Thyroid nodules Mother mother     Colonic polyp Brother Jean-Paul         pt believes    Factor V Leiden deficiency Maternal Aunt Danielle     Thyroid cancer Maternal Aunt Indira 58    Lymphoma Paternal Aunt Eliana         (type?)    Breast cancer Paternal Aunt Nikky 50        uni/bilat?    Pulmonary embolism Maternal Cousin Leandro         2/2 factor V Leiden; traveled to brain    Factor V Leiden deficiency Maternal Cousin Leandro     Allergy (severe) Maternal Cousin Stacey lord, almost  as a baby    Cancer Other [id number]         origin? (throat?)     Medications    Current Outpatient Medications:     atovaquone-proguaniL (MALARONE) 250-100 mg Tab, Take one tablet daily for malaria prevention. Begin one day before entering malarious area and continue for 1 week after return., Disp: 30 tablet, Rfl: 0    clindamycin (CLEOCIN T) 1 % Swab, Apply 1 each topically 2 (two) times daily., Disp: , Rfl:     dapsone (ACZONE) 7.5 % GlwP, Take 1 application  by mouth 2 (two) times daily. Apply to affected area, Disp: , Rfl:     dextroamphetamine-amphetamine (ADDERALL) 30 mg Tab, 1 tablet Orally Twice a day, Disp: , Rfl:     dextroamphetamine-amphetamine 30 mg Tab, Take 1 tablet by mouth 2 (two) times daily., Disp: , Rfl: 0    eszopiclone (LUNESTA) 3 mg Tab, TAKE TWO TABLET BY MOUTH AT BEDTIME AS NEEDED FOR sleep (Patient not taking: Reported on 3/7/2025), Disp: , Rfl:     hydrocortisone (CORTEF) 10 MG Tab, Take 10 mg by mouth. (Patient not taking: Reported on 3/7/2025), Disp: , Rfl:     hydrocortisone (CORTEF) 5 MG Tab, Take by mouth., Disp: , Rfl:     ivermectin (SOOLANTRA) 1 % Crea, Apply topically. (Patient not taking: Reported on 2024), Disp: , Rfl:     NP THYROID 30 mg Tab, Take 30 mg by mouth every morning., Disp: , Rfl:     nystatin (MYCOSTATIN) ointment, Apply pea-sized amount to affected  areas twice daily (mix with pea-sized amount of Triamcinolone ointment before applying). (Patient not taking: Reported on 5/29/2024), Disp: 30 g, Rfl: 0    progesterone (PROMETRIUM) 100 MG capsule, 1 capsule at bedtime Orally Once a day, Disp: , Rfl:     PROGESTERONE MISC, Apply topically. Every night, on wrist, Disp: , Rfl:     RETIN-A MICRO PUMP 0.06 % GlwP, , Disp: , Rfl:     TAZORAC 0.05 % Crea cream, 1 application every evening., Disp: , Rfl:     testosterone 50 mg pellet, Testosterone, Disp: , Rfl:     triamcinolone acetonide 0.1% (KENALOG) 0.1 % cream, Apply topically as needed., Disp: , Rfl:     triamcinolone acetonide 0.1% (KENALOG) 0.1 % ointment, Apply pea-sized amount to affected areas twice daily (mix with pea-sized amount of Nystatin ointment before applying)., Disp: 30 g, Rfl: 0    valACYclovir (VALTREX) 1000 MG tablet, Take 1,000 mg by mouth., Disp: , Rfl:     valACYclovir (VALTREX) 500 MG tablet, , Disp: , Rfl:     zolpidem (AMBIEN CR) 12.5 MG CR tablet, , Disp: , Rfl:     zolpidem (AMBIEN CR) 12.5 MG CR tablet, Take 1 tablet by mouth nightly as needed., Disp: , Rfl:     zolpidem (AMBIEN) 5 MG Tab, Ambien, Disp: , Rfl:   Allergies  Review of patient's allergies indicates:   Allergen Reactions    Sulfa (sulfonamide antibiotics) Rash       Review of Systems       See above   All other systems reviewed and are negative.    Objective:      Vitals:   There were no vitals filed for this visit.      BMI: There is no height or weight on file to calculate BMI.   There is no height or weight on file to calculate BSA.    Physical Exam  Limited as virtual   Appears comfortable    Laboratory Data: reviewed most recent   Imaging: reviewed most recent        Assessment:     1. Mass of upper outer quadrant of left breast    2. At high risk for breast cancer    3. Family history of breast cancer    4. Dense breast tissue on mammogram, unspecified type    5. Breast cancer screening, high risk patient    6. Encounter  for screening mammogram for breast cancer              Plan:     Left breast US due to self palpated mass.  See me in 3 months for breast exam  Continue high risk screening with MMG/MRI   MMG due 11/2025  2 CBE annually- one per GYN an one here.  Lifestyle modifications as previously discussed.   Encourage breast awareness, including monthly breast self-exams.     See me in November  Route Chart for Scheduling    Med Onc Chart Routing  Urgent    Follow up with physician    Follow up with [facility] . 3 months for breast exam-PJ   Infusion scheduling note    Injection scheduling note    Labs    Imaging   L breast US now; mmg due 11/2025   Pharmacy appointment    Other referrals                   Questions were encouraged and answered to patient's satisfaction, and patient verbalized understanding of information and agreement with the plan. Advised patient to RTC with any interval changes or concerns.        Patient is in agreement with the proposed treatment plan. All questions were answered to the patient's satisfaction. Pt knows to call clinic for any new or worsening symptoms and if anything is needed before the next clinic visit.    Siri Rowan, MSN, APRN, FNP-C  [facility]  Hematology & Medical Oncology  [address] [place] [id number]  ph. [phone] ext [phone]  Fax. [phone] [id number] code applied: patient requires or will require a continuous, longitudinal, and active collaborative plan of care related to this patient's health condition, high risk cancer --the management of which requires the direction of a practitioner with specialized clinical knowledge, skill, and expertise.

## 2025-07-17 ENCOUNTER — HOSPITAL ENCOUNTER (OUTPATIENT)
Dept: RADIOLOGY | Facility: OTHER | Age: 45
Discharge: HOME | End: 2025-07-17
Attending: NURSE PRACTITIONER
Payer: COMMERCIAL

## 2025-07-17 DIAGNOSIS — N63.21 MASS OF UPPER OUTER QUADRANT OF LEFT BREAST: ICD-10-CM

## 2025-07-17 PROCEDURE — 77065 DX MAMMO INCL CAD UNI: CPT | Mod: 26,LT,, | Performed by: RADIOLOGY

## 2025-07-17 PROCEDURE — 76642 ULTRASOUND BREAST LIMITED: CPT | Mod: 26,LT,, | Performed by: RADIOLOGY

## 2025-07-17 PROCEDURE — 76642 ULTRASOUND BREAST LIMITED: CPT | Mod: TC,LT

## 2025-07-17 PROCEDURE — 77061 BREAST TOMOSYNTHESIS UNI: CPT | Mod: TC,LT

## 2025-07-17 PROCEDURE — 77061 BREAST TOMOSYNTHESIS UNI: CPT | Mod: 26,LT,, | Performed by: RADIOLOGY
